# Patient Record
Sex: MALE | Race: WHITE | URBAN - METROPOLITAN AREA
[De-identification: names, ages, dates, MRNs, and addresses within clinical notes are randomized per-mention and may not be internally consistent; named-entity substitution may affect disease eponyms.]

---

## 2017-01-03 ENCOUNTER — TRANSFERRED RECORDS (OUTPATIENT)
Dept: HEALTH INFORMATION MANAGEMENT | Facility: CLINIC | Age: 62
End: 2017-01-03

## 2017-01-30 ENCOUNTER — TRANSFERRED RECORDS (OUTPATIENT)
Dept: HEALTH INFORMATION MANAGEMENT | Facility: CLINIC | Age: 62
End: 2017-01-30

## 2017-02-05 ENCOUNTER — TRANSFERRED RECORDS (OUTPATIENT)
Dept: HEALTH INFORMATION MANAGEMENT | Facility: CLINIC | Age: 62
End: 2017-02-05

## 2017-05-01 ENCOUNTER — TRANSFERRED RECORDS (OUTPATIENT)
Dept: HEALTH INFORMATION MANAGEMENT | Facility: CLINIC | Age: 62
End: 2017-05-01

## 2017-05-26 ENCOUNTER — APPOINTMENT (OUTPATIENT)
Dept: TRANSPLANT | Facility: CLINIC | Age: 62
End: 2017-05-26

## 2017-05-26 ENCOUNTER — TELEPHONE (OUTPATIENT)
Dept: TRANSPLANT | Facility: CLINIC | Age: 62
End: 2017-05-26

## 2017-05-26 DIAGNOSIS — N18.6 END STAGE RENAL DISEASE (H): ICD-10-CM

## 2017-05-26 DIAGNOSIS — N05.1 FSGS (FOCAL SEGMENTAL GLOMERULOSCLEROSIS): ICD-10-CM

## 2017-05-26 DIAGNOSIS — I07.1 TRICUSPID REGURGITATION: ICD-10-CM

## 2017-05-26 DIAGNOSIS — I10 ESSENTIAL HYPERTENSION: ICD-10-CM

## 2017-05-26 DIAGNOSIS — Z76.82 ORGAN TRANSPLANT CANDIDATE: ICD-10-CM

## 2017-05-26 DIAGNOSIS — I25.10 CARDIOVASCULAR DISEASE: ICD-10-CM

## 2017-05-26 NOTE — LETTER
Bro Barnes  [address] [facility] COVE  [place] R2J 4A4          Dear Bro,    Thank you for your interest in the Transplant Center at [facility], [facility]. We look forward to being a part your care team and assisting you through the transplant process.    As we discussed, your transplant coordinator is Charmaine Fritz .  You may call your coordinator at any time with questions or concerns.    Please complete the following.    1. Sign up for:    [employer], your electronic medical record    [employer], the Transplant Center's website (see enclosed booklet)    You can use these tools to learn more about your transplant, communicate with your care team, and track your medical details    2. Fill out and return the enclosed forms    Authorization for Electronic Communication    Authorization to Discuss Protected Health Information    [employer] Release of Information       Best Wishes,      Solid Organ Transplant Intake  [facility], [facility]    cc: Referring Physician        Dialysis Unit        PCP

## 2017-05-26 NOTE — TELEPHONE ENCOUNTER
Intake Progress Note  Organ:  kidney    Referral Came Via (Fax from/phone call from):  Letter from [facility]    Referring Physician:  Dr. 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 is best but try anytime    Records:  [facility] requested from: [facility], [facility], [address] [place] [address], [facility]     Insurance information:  [place] [employer]  Policy randall:  self  Subscriber/policy/ID number: [facility] [id number]  [facility] [id number]    History of diabetes:  no            History of a kidney biopsy:  yes         If Yes,when and where:  3[facility]    Past Medical and Surgical History (updated in Epic medical / surgical):             History of  cancer personally:  no,                                History of cardiac events: heart tx in 2016              History abdominal surgery : no                        History of previous transplant: yes heart [facility] 2013                Listed or in eval at another transplant center?  [facility]             History of hospitalization in last 12 months:  no                  History of blood transfusion:  Yes      History of smoking: no            On dialysis:yes Where: [address] [place] [address] home hemo                 Type of Dialysis: home hemo  Start date: 10/5/2016       Dialysis Days:  Nightly at home       If NYOD, estimated GFR: 6   Height:  1.72 M  Weight:  80kg  BMI:  27    Health Maintenance:  Colon:  never  PSA: yearly at PCP  Dental: last month, Dr. Mikey Meyers  Vaccines up to date  Special Needs (ie--wheelchair, assistance, guardian, interpretor):  no      Informed patient on the need to arrange age appropriate cancer screening, vaccines up to date and dental clearance    Reviewed evaluation process  and reminded patient to complete questionnaire, complete medical records release, and review packet prior to evaluation visit     Informed patient that coordinator will review their chart and insurance coverage and if no concerns they will receive a call from a  to schedule evaluation

## 2017-05-26 NOTE — TELEPHONE ENCOUNTER
I received a referral from Dr. Mills from the transplant office at [facility], in [facility]. I attempted to contact him and reached his wife. I gave her my name, number and hours and she is going to relay the message to Bro.

## 2017-05-30 ENCOUNTER — MEDICAL CORRESPONDENCE (OUTPATIENT)
Dept: TRANSPLANT | Facility: CLINIC | Age: 62
End: 2017-05-30

## 2017-05-31 ENCOUNTER — MEDICAL CORRESPONDENCE (OUTPATIENT)
Dept: TRANSPLANT | Facility: CLINIC | Age: 62
End: 2017-05-31

## 2017-06-05 ENCOUNTER — MEDICAL CORRESPONDENCE (OUTPATIENT)
Dept: TRANSPLANT | Facility: CLINIC | Age: 62
End: 2017-06-05

## 2017-06-13 ENCOUNTER — MEDICAL CORRESPONDENCE (OUTPATIENT)
Dept: TRANSPLANT | Facility: CLINIC | Age: 62
End: 2017-06-13

## 2017-06-29 NOTE — TELEPHONE ENCOUNTER
Contacted patient and introduced myself as their Transplant Coordinator, also introduced the role of the Transplant Coordinator in the transplant process.  Explained the purpose of this call including reviewing next steps and answering questions.    Confirmed Referring Provider, Dialysis Center, and Primary Care Physician. Notified patient of the importance of continued communication with referring providers and primary care physicians.    Reviewed components of transplant evaluation process including necessary appointments, tests, and procedures.    Answered questions for patient regarding evaluation, provided my name and contact information and requested they call with any additional questions.    Determined that patient would like additional information regarding transplant by:     Drop Down choices: Mail, Email, MyChart, Phone Call   Encourage MyChart   Notified patients that they will hear from a Transplant  to schedule evaluation.       Medical records reviewed in [email or web address] and patient interviewed. 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 He developed ischemic cardiomyopathy in 2003, had an AICD placed after cardiac arrest and LVAD placed in 2012 and ultimately heart transplant in 2013. 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 He is listed for kidney transplant at [facility] but PRA is 100% and he is hoping to increase his chances of transplant by listing here as well. He does have a potential living donor that has been approved in [place] but incompatible blood type. They are interested in PEP. He is aware that he will have to go through an evaluation here as well as his donor. History also include migraines, UTI's from bacterial prostatitis that is no longer an issue,tricuspid regurgitation in transplanted heart, HTN, hypothyroid, GERD, SPEP IgG restriction ( has  been discharged from his hematologist with the recommendation of annual SPEP/UPEP) splenomegaly, mild thrombocytopenia and anemia. Surgeries include the heart transplant, exploratory lap for SBO with no resection of bowel -only lysis of adhesions and right lymphocele repair. His dental is up to date 4/2017. Interestingly, he has never had a colonoscopy and was informed that is the gold standard here and he will have one done. He is due to see his cardiologist on July 10,2017 and will utilize this visit for cardiac clearance for kidney transplant. Has received blood transfusions. Never a smoker. No etoh. BMI 27. All records acceptable to proceed with pre kidney evaluation.    We discussed the 2 day evaluation process. First day he will arrive fasting and have his labs drawn. Once labs are drawn he will be allowed to eat breakfast and was encouraged to bring breakfast or money to purchase. We talked about the online presentation in a group setting of [facility] and he was encouraged to bring someone with him. Reviewed the list of providers he will see and their roles. Discussed the overall evaluation/approval/wait list/living donor and PEP process. He will check with his donor as to availability to travel here as she works too. He will fax me documentation confirming his actual dialysis start date. He is aware his next contact will be from Shameka in scheduling. Provided my and Shameka's contact information. He stated the transplant evaluation process here is very similar to the [place] system.

## 2017-06-30 ENCOUNTER — TELEPHONE (OUTPATIENT)
Dept: TRANSPLANT | Facility: CLINIC | Age: 62
End: 2017-06-30

## 2017-07-10 ENCOUNTER — TELEPHONE (OUTPATIENT)
Dept: TRANSPLANT | Facility: CLINIC | Age: 62
End: 2017-07-10

## 2017-07-10 ENCOUNTER — TRANSFERRED RECORDS (OUTPATIENT)
Dept: HEALTH INFORMATION MANAGEMENT | Facility: CLINIC | Age: 62
End: 2017-07-10

## 2017-07-10 NOTE — LETTER
July 11, 2017    Bro Barnes  [address]  [place] R2J 4A4      Dear Mr. Barnes,    Attached is your Pre Kidney Evaluation schedule on July 31, 2017 and your dialysis schedule on August 1, 2017. Please feel free to contact me at [phone], if you have any questions.       Sincerely,   Shameka GARCIA    CC:Loretta FERNANDEZ                                                    [facility] [address]  [address]      EVALUATION SCHEDULE: KIDNEY TRANSPLANT SLOT 3 EVAL      Patient:   BRO BARNES  MR#:    [id number]  Coordinator:   Loretta FERNANDEZ     [phone]   :   Shameka GARCIA     [phone]  Location:   Transplant Center Clinic 3A  Date:    July 31, 2017 & August 1, 2017      This is your evaluation schedule, please follow dates and times.  You  will receive reminder phone calls for other tests, but please follow this schedule only!  If you have any questions about dates and times,  please call us on number listed above.  Thank you, Transplant Clinic.         NO FOOD or DRINK AFTER MIDNIGHT  (You may only have small amounts of water)      Day/Date:   Monday, July 31, 2017  Time Location Activity   6:30a.m. [facility]  Imaging and Lab Testing  1st floor Blood tests (fasting, PLEASE)    7:00a.m. [facility]  Imaging and Lab Testing  1st floor Aorta Imaging =NO FOOD or DRINK AFTER MIDNIGHT!!   7:30a.m. [facility]  Transplant Services  3rd floor; Clinic 3A Review evaluation process, vitals, medication review   7:50a.m-  9:00a.m. [facility]  Transplant Services  3rd floor; Clinic 3A Pre-transplant class   9:00a.m. [facility]  Transplant Services  3rd floor; Clinic 3A; Consult Room Appointment with Natalie Mcnally,  Registered Dietitian   9:30a.m. [facility]  Transplant  Services  3rd floor; Clinic 3A Appointment with Dr. Cordero.   Transplant Surgeon   10:00a.m. [facility]  Transplant Services  3rd floor; Clinic 3A Meet with Loretta FERNANDEZ,  Transplant Coordinator   10:30a.m. [facility]  Transplant Services  3rd floor; Clinic 3B Appointment with Dr. Suarez,   Transplant Nephrologist   11:15a.m. [facility]  Transplant Services  3rd floor; Clinic 3A; Consult Room Appointment with either Oneyda Mullins,  Transplant      12:15p.m.-  12:30p.m. [facility]  Transplant Services  3rd floor; Clinic 3A; Consult Room Research    12:30p.m.-  1:30p.m. LUNCH    1:30p.m. [facility]  Imaging and Lab Testing  1st floor 2nd ABO blood draw - confirmation of 1st draw   1:45p.m. [facility]  Imaging and Lab Testing  1st floor Chest X-ray    2:20p.m. [facility]  Imaging and Lab Testing  1st floor EKG   4:00p.m. [facility]  Cardiology/Heart Care Clinic  3rd floor; Clinic 3L Echocardiogram       Day/Date:   Tuesday, August 1, 2017  Time Location Activity   2:00p.m. Dialysis Unit  [place] [address] [address]  [address] Dialysis

## 2017-07-14 ENCOUNTER — MEDICAL CORRESPONDENCE (OUTPATIENT)
Dept: TRANSPLANT | Facility: CLINIC | Age: 62
End: 2017-07-14

## 2017-07-31 ENCOUNTER — ALLIED HEALTH/NURSE VISIT (OUTPATIENT)
Dept: TRANSPLANT | Facility: CLINIC | Age: 62
End: 2017-07-31
Attending: INTERNAL MEDICINE
Payer: COMMERCIAL

## 2017-07-31 ENCOUNTER — OFFICE VISIT (OUTPATIENT)
Dept: TRANSPLANT | Facility: CLINIC | Age: 62
End: 2017-07-31
Attending: SURGERY
Payer: COMMERCIAL

## 2017-07-31 ENCOUNTER — RADIANT APPOINTMENT (OUTPATIENT)
Dept: CARDIOLOGY | Facility: CLINIC | Age: 62
End: 2017-07-31
Attending: PHYSICIAN ASSISTANT

## 2017-07-31 ENCOUNTER — RESULTS ONLY (OUTPATIENT)
Dept: OTHER | Facility: CLINIC | Age: 62
End: 2017-07-31

## 2017-07-31 VITALS
HEART RATE: 96 BPM | SYSTOLIC BLOOD PRESSURE: 115 MMHG | BODY MASS INDEX: 27.16 KG/M2 | OXYGEN SATURATION: 98 % | DIASTOLIC BLOOD PRESSURE: 73 MMHG | HEIGHT: 68 IN | TEMPERATURE: 98.4 F | RESPIRATION RATE: 18 BRPM | WEIGHT: 179.2 LBS

## 2017-07-31 DIAGNOSIS — Z76.82 AWAITING ORGAN TRANSPLANT: Primary | ICD-10-CM

## 2017-07-31 DIAGNOSIS — I10 ESSENTIAL HYPERTENSION: ICD-10-CM

## 2017-07-31 DIAGNOSIS — N18.6 END STAGE RENAL DISEASE (H): ICD-10-CM

## 2017-07-31 DIAGNOSIS — N05.1 FSGS (FOCAL SEGMENTAL GLOMERULOSCLEROSIS): ICD-10-CM

## 2017-07-31 DIAGNOSIS — Z76.82 ORGAN TRANSPLANT CANDIDATE: ICD-10-CM

## 2017-07-31 DIAGNOSIS — Z76.82 ORGAN TRANSPLANT CANDIDATE: Primary | ICD-10-CM

## 2017-07-31 DIAGNOSIS — I25.10 CARDIOVASCULAR DISEASE: ICD-10-CM

## 2017-07-31 DIAGNOSIS — N28.1 RENAL CYST, LEFT: ICD-10-CM

## 2017-07-31 DIAGNOSIS — R82.81 PYURIA: Primary | ICD-10-CM

## 2017-07-31 DIAGNOSIS — R80.9 PROTEINURIA, UNSPECIFIED TYPE: ICD-10-CM

## 2017-07-31 LAB
ABO + RH BLD: NORMAL
ALBUMIN SERPL-MCNC: 3.9 G/DL (ref 3.4–5)
ALBUMIN UR-MCNC: >499 MG/DL
ALP SERPL-CCNC: 105 U/L (ref 40–150)
ALT SERPL W P-5'-P-CCNC: 24 U/L (ref 0–70)
AMORPH CRY #/AREA URNS HPF: ABNORMAL /HPF
ANION GAP SERPL CALCULATED.3IONS-SCNC: 9 MMOL/L (ref 3–14)
APPEARANCE UR: ABNORMAL
APTT PPP: 30 SEC (ref 22–37)
AST SERPL W P-5'-P-CCNC: 16 U/L (ref 0–45)
BACTERIA #/AREA URNS HPF: ABNORMAL /HPF
BASOPHILS # BLD AUTO: 0 10E9/L (ref 0–0.2)
BASOPHILS NFR BLD AUTO: 0.5 %
BILIRUB SERPL-MCNC: 0.6 MG/DL (ref 0.2–1.3)
BILIRUB UR QL STRIP: NEGATIVE
BLD GP AB SCN SERPL QL: NORMAL
BLD GP AB SCN TITR SERPL: NORMAL {TITER}
BLOOD BANK CMNT PATIENT-IMP: NORMAL
BUN SERPL-MCNC: 48 MG/DL (ref 7–30)
CALCIUM SERPL-MCNC: 8.9 MG/DL (ref 8.5–10.1)
CARDIOLIPIN ANTIBODY IGG: NORMAL GPL-U/ML (ref 0–19.9)
CARDIOLIPIN ANTIBODY IGM: 0.4 MPL-U/ML (ref 0–19.9)
CHLORIDE SERPL-SCNC: 94 MMOL/L (ref 94–109)
CHOLEST SERPL-MCNC: 102 MG/DL
CMV IGG SERPL QL IA: ABNORMAL AI (ref 0–0.8)
CO2 SERPL-SCNC: 31 MMOL/L (ref 20–32)
COLOR UR AUTO: ABNORMAL
CREAT SERPL-MCNC: 8.02 MG/DL (ref 0.66–1.25)
DIFFERENTIAL METHOD BLD: ABNORMAL
EBV VCA IGG SER QL IA: NORMAL AI (ref 0–0.8)
EOSINOPHIL # BLD AUTO: 0 10E9/L (ref 0–0.7)
EOSINOPHIL NFR BLD AUTO: 1 %
ERYTHROCYTE [DISTWIDTH] IN BLOOD BY AUTOMATED COUNT: 13.2 % (ref 10–15)
GFR SERPL CREATININE-BSD FRML MDRD: 7 ML/MIN/1.7M2
GLUCOSE SERPL-MCNC: 106 MG/DL (ref 70–99)
GLUCOSE UR STRIP-MCNC: NEGATIVE MG/DL
HBV CORE AB SERPL QL IA: NONREACTIVE
HBV SURFACE AB SERPL IA-ACNC: 15.97 M[IU]/ML
HBV SURFACE AG SERPL QL IA: NONREACTIVE
HCT VFR BLD AUTO: 28.2 % (ref 40–53)
HCV AB SERPL QL IA: NORMAL
HDLC SERPL-MCNC: 43 MG/DL
HGB BLD-MCNC: 9 G/DL (ref 13.3–17.7)
HGB UR QL STRIP: ABNORMAL
HIV 1+2 AB+HIV1 P24 AG SERPL QL IA: NORMAL
HLA TYPING COMPLETE SOT RECIPIENT: NORMAL
HYALINE CASTS #/AREA URNS LPF: 2 /LPF (ref 0–2)
IMM GRANULOCYTES # BLD: 0 10E9/L (ref 0–0.4)
IMM GRANULOCYTES NFR BLD: 0.2 %
INR PPP: 1.1 (ref 0.86–1.14)
KETONES UR STRIP-MCNC: 5 MG/DL
LDLC SERPL CALC-MCNC: 43 MG/DL
LEUKOCYTE ESTERASE UR QL STRIP: ABNORMAL
LYMPHOCYTES # BLD AUTO: 1.2 10E9/L (ref 0.8–5.3)
LYMPHOCYTES NFR BLD AUTO: 27.6 %
MCH RBC QN AUTO: 31.4 PG (ref 26.5–33)
MCHC RBC AUTO-ENTMCNC: 31.9 G/DL (ref 31.5–36.5)
MCV RBC AUTO: 98 FL (ref 78–100)
MONOCYTES # BLD AUTO: 0.5 10E9/L (ref 0–1.3)
MONOCYTES NFR BLD AUTO: 11.3 %
MUCOUS THREADS #/AREA URNS LPF: PRESENT /LPF
NEUTROPHILS # BLD AUTO: 2.5 10E9/L (ref 1.6–8.3)
NEUTROPHILS NFR BLD AUTO: 59.4 %
NITRATE UR QL: NEGATIVE
NONHDLC SERPL-MCNC: 59 MG/DL
NRBC # BLD AUTO: 0 10*3/UL
NRBC BLD AUTO-RTO: 0 /100
PH UR STRIP: 5 PH (ref 5–7)
PHOSPHATE SERPL-MCNC: 3.6 MG/DL (ref 2.5–4.5)
PLATELET # BLD AUTO: 119 10E9/L (ref 150–450)
POTASSIUM SERPL-SCNC: 4.2 MMOL/L (ref 3.4–5.3)
PRA SINGLE ANTIGEN IGG ANTIBODY: NORMAL
PROT SERPL-MCNC: 6.8 G/DL (ref 6.8–8.8)
PSA SERPL-ACNC: 0.68 UG/L (ref 0–4)
RBC # BLD AUTO: 2.87 10E12/L (ref 4.4–5.9)
RBC #/AREA URNS AUTO: 18 /HPF (ref 0–2)
SODIUM SERPL-SCNC: 134 MMOL/L (ref 133–144)
SP GR UR STRIP: 1.02 (ref 1–1.03)
SPECIMEN EXP DATE BLD: NORMAL
SPECIMEN EXP DATE BLD: NORMAL
SQUAMOUS #/AREA URNS AUTO: 2 /HPF (ref 0–1)
T PALLIDUM IGG+IGM SER QL: NEGATIVE
THROMBIN TIME: 17.5 SEC (ref 13–19)
TRIGL SERPL-MCNC: 76 MG/DL
URN SPEC COLLECT METH UR: ABNORMAL
UROBILINOGEN UR STRIP-MCNC: 0 MG/DL (ref 0–2)
VZV IGG SER QL IA: 6.1 AI (ref 0–0.8)
WBC # BLD AUTO: 4.2 10E9/L (ref 4–11)
WBC #/AREA URNS AUTO: 151 /HPF (ref 0–2)
WBC CLUMPS #/AREA URNS HPF: PRESENT /HPF

## 2017-07-31 PROCEDURE — 86850 RBC ANTIBODY SCREEN: CPT | Performed by: PHYSICIAN ASSISTANT

## 2017-07-31 PROCEDURE — 82784 ASSAY IGA/IGD/IGG/IGM EACH: CPT | Performed by: PHYSICIAN ASSISTANT

## 2017-07-31 PROCEDURE — 80053 COMPREHEN METABOLIC PANEL: CPT | Performed by: PHYSICIAN ASSISTANT

## 2017-07-31 PROCEDURE — 84166 PROTEIN E-PHORESIS/URINE/CSF: CPT | Performed by: PHYSICIAN ASSISTANT

## 2017-07-31 PROCEDURE — 86833 HLA CLASS II HIGH DEFIN QUAL: CPT | Performed by: INTERNAL MEDICINE

## 2017-07-31 PROCEDURE — 86780 TREPONEMA PALLIDUM: CPT | Performed by: PHYSICIAN ASSISTANT

## 2017-07-31 PROCEDURE — 86900 BLOOD TYPING SEROLOGIC ABO: CPT | Mod: 91 | Performed by: PHYSICIAN ASSISTANT

## 2017-07-31 PROCEDURE — 86704 HEP B CORE ANTIBODY TOTAL: CPT | Performed by: PHYSICIAN ASSISTANT

## 2017-07-31 PROCEDURE — 86665 EPSTEIN-BARR CAPSID VCA: CPT | Performed by: PHYSICIAN ASSISTANT

## 2017-07-31 PROCEDURE — 85730 THROMBOPLASTIN TIME PARTIAL: CPT | Mod: 91 | Performed by: PHYSICIAN ASSISTANT

## 2017-07-31 PROCEDURE — 87340 HEPATITIS B SURFACE AG IA: CPT | Performed by: PHYSICIAN ASSISTANT

## 2017-07-31 PROCEDURE — 97802 MEDICAL NUTRITION INDIV IN: CPT | Mod: ZF | Performed by: DIETITIAN, REGISTERED

## 2017-07-31 PROCEDURE — 86147 CARDIOLIPIN ANTIBODY EA IG: CPT | Performed by: PHYSICIAN ASSISTANT

## 2017-07-31 PROCEDURE — 81240 F2 GENE: CPT | Performed by: PHYSICIAN ASSISTANT

## 2017-07-31 PROCEDURE — 84100 ASSAY OF PHOSPHORUS: CPT | Performed by: PHYSICIAN ASSISTANT

## 2017-07-31 PROCEDURE — 86706 HEP B SURFACE ANTIBODY: CPT | Performed by: PHYSICIAN ASSISTANT

## 2017-07-31 PROCEDURE — 86901 BLOOD TYPING SEROLOGIC RH(D): CPT | Performed by: PHYSICIAN ASSISTANT

## 2017-07-31 PROCEDURE — 86905 BLOOD TYPING RBC ANTIGENS: CPT | Performed by: PHYSICIAN ASSISTANT

## 2017-07-31 PROCEDURE — 40000866 ZZHCL STATISTIC HIV 1/2 ANTIGEN/ANTIBODY PRETRANSPLANT ONLY: Performed by: PHYSICIAN ASSISTANT

## 2017-07-31 PROCEDURE — 81001 URINALYSIS AUTO W/SCOPE: CPT | Performed by: PHYSICIAN ASSISTANT

## 2017-07-31 PROCEDURE — 86644 CMV ANTIBODY: CPT | Performed by: PHYSICIAN ASSISTANT

## 2017-07-31 PROCEDURE — 86886 COOMBS TEST INDIRECT TITER: CPT | Performed by: PHYSICIAN ASSISTANT

## 2017-07-31 PROCEDURE — G0103 PSA SCREENING: HCPCS | Performed by: PHYSICIAN ASSISTANT

## 2017-07-31 PROCEDURE — 86334 IMMUNOFIX E-PHORESIS SERUM: CPT | Performed by: PHYSICIAN ASSISTANT

## 2017-07-31 PROCEDURE — 80061 LIPID PANEL: CPT | Performed by: PHYSICIAN ASSISTANT

## 2017-07-31 PROCEDURE — 83883 ASSAY NEPHELOMETRY NOT SPEC: CPT | Performed by: PHYSICIAN ASSISTANT

## 2017-07-31 PROCEDURE — 85025 COMPLETE CBC W/AUTO DIFF WBC: CPT | Performed by: PHYSICIAN ASSISTANT

## 2017-07-31 PROCEDURE — 86335 IMMUNFIX E-PHORSIS/URINE/CSF: CPT | Performed by: PHYSICIAN ASSISTANT

## 2017-07-31 PROCEDURE — 84165 PROTEIN E-PHORESIS SERUM: CPT | Performed by: PHYSICIAN ASSISTANT

## 2017-07-31 PROCEDURE — 81376 HLA II TYPING 1 LOCUS LR: CPT | Performed by: INTERNAL MEDICINE

## 2017-07-31 PROCEDURE — 85610 PROTHROMBIN TIME: CPT | Performed by: PHYSICIAN ASSISTANT

## 2017-07-31 PROCEDURE — 87799 DETECT AGENT NOS DNA QUANT: CPT | Performed by: PHYSICIAN ASSISTANT

## 2017-07-31 PROCEDURE — 81241 F5 GENE: CPT | Performed by: PHYSICIAN ASSISTANT

## 2017-07-31 PROCEDURE — 86787 VARICELLA-ZOSTER ANTIBODY: CPT | Performed by: PHYSICIAN ASSISTANT

## 2017-07-31 PROCEDURE — 00000402 ZZHCL STATISTIC TOTAL PROTEIN: Performed by: PHYSICIAN ASSISTANT

## 2017-07-31 PROCEDURE — 86480 TB TEST CELL IMMUN MEASURE: CPT | Performed by: PHYSICIAN ASSISTANT

## 2017-07-31 PROCEDURE — 85730 THROMBOPLASTIN TIME PARTIAL: CPT | Performed by: PHYSICIAN ASSISTANT

## 2017-07-31 PROCEDURE — 81370 HLA I & II TYPING LR: CPT | Performed by: INTERNAL MEDICINE

## 2017-07-31 PROCEDURE — 85613 RUSSELL VIPER VENOM DILUTED: CPT | Performed by: PHYSICIAN ASSISTANT

## 2017-07-31 PROCEDURE — 85670 THROMBIN TIME PLASMA: CPT | Performed by: PHYSICIAN ASSISTANT

## 2017-07-31 PROCEDURE — 86832 HLA CLASS I HIGH DEFIN QUAL: CPT | Performed by: INTERNAL MEDICINE

## 2017-07-31 PROCEDURE — 87086 URINE CULTURE/COLONY COUNT: CPT | Performed by: PHYSICIAN ASSISTANT

## 2017-07-31 PROCEDURE — 36415 COLL VENOUS BLD VENIPUNCTURE: CPT | Performed by: PHYSICIAN ASSISTANT

## 2017-07-31 PROCEDURE — 86803 HEPATITIS C AB TEST: CPT | Performed by: PHYSICIAN ASSISTANT

## 2017-07-31 RX ORDER — CANDESARTAN 4 MG/1
2 TABLET ORAL DAILY
COMMUNITY

## 2017-07-31 RX ORDER — TACROLIMUS 1 MG/1
1 CAPSULE, GELATIN COATED ORAL 2 TIMES DAILY
COMMUNITY

## 2017-07-31 RX ORDER — MYCOPHENOLATE MOFETIL 500 MG/1
750 TABLET, FILM COATED ORAL
COMMUNITY

## 2017-07-31 NOTE — PROGRESS NOTES
Psychosocial Assessment  Patient Name/ Age: Bro Barnes 62 year old   Medical Record #: [id number]  Duration of Interview: 30 min  Process:   Face-to-Face Interview                (counseling < 50%)   Present at Appointment: Bro and wife Marlys        : STEPHANIE Saleem  Date:  July 31, 2017        Type of transplant: Kidney    Donor type: Bro reported his sister-in-law is approved in [place] through paired exchange and is hopeful he can be      [place] and sister-in-law   Prior Transplants:    Yes - Heart 2013 Status of Transplant: Bro reported functioning well       Current Living Situation    Location:   [address] [place] [address] R2J 4A4  [place]  With Whom: lives with their spouse       Family/ Social Support:    Bro reported he has two adult sons who live in [place]. Bro reported he has two brothers who live in [place] and one in [place]. 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 Encouraged Bro to work with [employer] regarding travel/lodging.        Medical Status    Current mode of treatment for ESRD dialysis   Complications none       Behavioral    Tobacco Use No Chemical Dependency No   Bro denied any tobacco use.  Bro reported he drinks about one glass of wine a month. Bro denied any substance use or history of chemical  dependency treatment.      Psychiatric Impairment No  Bro denied any current or history of anxiety, depression, or other mental health concerns.    Reading ability Good  Education Level: Medical Degree Recent Legal History No      Coping Style/Strategies: Exercise, reading, listening to music, being with family       Ability to Adhere to Complex Medical Regime: Yes     Adherence History: Bro reported he follows his physician's recommendations, takes his medications as prescribed, and attends his appointments as scheduled.         Education  _X_ Medicare  _X_ Rehabilitation  _X_ Donor issues  _X_ Community resources  _X_ Post discharge housing  _X_ Financial resources  _X_ Medical insurance options  _X_ Psych adjustment  _X_ Family adjustment  _X_ Health Care Directive Okay with wife   Psychosocial Risks of Transplant Reviewed and Discussed:  _X_ Increased stress related to emotional,            family, social, employment or financial           situation  _X_ Affect on work and/or disability benefits  _X_ Affect on future health and life           insurance  _X_ Transplant outcome expectations may           not be met  _X_ Mental Health Risks: anxiety,           depression, PTSD, guilt, grief and           chronic fatigue     Notable Items:   None noted.       Final Evaluation/Assessment   Patient seemed to process information well. Appeared well informed, motivated and able to follow post transplant requirements. Behavior was appropriate during interview. Has adequate income and insurance coverage. Adequate social support. No major contraindications noted for transplant.  At this time patient appears to understand the risks and benefits of transplant.      Recommendation  Acceptable    Selection Criteria Met:  Plan for support Yes   Chemical Dependence Yes   Smoking Yes   Mental Health Yes   Adequate Finances Yes    Signature: STEPHANIE Saleem    Title: Clinical

## 2017-07-31 NOTE — MR AVS SNAPSHOT
"              After Visit Summary   7/31/2017    Bro Barnes    MRN: [id number]           Patient Information     Date Of Birth          1955        Visit Information        Provider Department      7/31/2017 10:30 AM  [facility] PATIENT 3 [facility] Solid Organ Transplant        Today's Diagnoses     Pyuria    -  1    Proteinuria, unspecified type        Renal cyst, left           Follow-ups after your visit        Who to contact     If you have questions or need follow up information about today's clinic visit or your schedule please contact [facility] SOLID ORGAN TRANSPLANT directly at [phone].  Normal or non-critical lab and imaging results will be communicated to you by [employer], letter or phone within 4 business days after the clinic has received the results. If you do not hear from us within 7 days, please contact the clinic through [employer] or phone. If you have a critical or abnormal lab result, we will notify you by phone as soon as possible.  Submit refill requests through [employer] or call your pharmacy and they will forward the refill request to us. Please allow 3 business days for your refill to be completed.          Additional Information About Your Visit        MyChart Information     [employer] gives you secure access to your electronic health record. If you see a primary care provider, you can also send messages to your care team and make appointments. If you have questions, please call your primary care clinic.  If you do not have a primary care provider, please call [phone] and they will assist you.        Care EveryWhere ID     This is your Care EveryWhere ID. This could be used by other organizations to access your [facility] medical records  [id number]        Your Vitals Were     Pulse Temperature Respirations Height Pulse Oximetry BMI (Body Mass Index)    96 98.4  F (36.9  C) (Oral) 18 1.72 m (5' 7.72\") 98% 27.48 kg/m2       Blood Pressure from Last 3 Encounters:   07/31/17 115/73    " Weight from Last 3 Encounters:   07/31/17 81.3 kg (179 lb 3.2 oz)              We Performed the Following     Protein electrophoresis random urine     Protein immunofixation urine     Urine Culture Aerobic Bacterial        Primary Care Provider Office Phone # Fax #    Eduardo Coleman [phone]       [facility] [address]  [facility] [address]  [place]        Equal Access to Services     [facility] : Hadii aad ku hadasho Soomaali, waaxda luqadaha, qaybta kaalmada adeegyada, waxay idiin hayaan adesaritha khjorgesh la'aan . So [facility] [phone].    ATENCIÓN: Si habla español, tiene a ortiz disposición servicios gratuitos de asistencia lingüística. Llame al [phone].    We comply with applicable federal civil rights laws and [place] laws. We do not discriminate on the basis of race, color, national origin, age, disability sex, sexual orientation or gender identity.            Thank you!     Thank you for choosing [facility] SOLID ORGAN TRANSPLANT  for your care. Our goal is always to provide you with excellent care. Hearing back from our patients is one way we can continue to improve our services. Please take a few minutes to complete the written survey that you may receive in the mail after your visit with us. Thank you!             Your Updated Medication List - Protect others around you: Learn how to safely use, store and throw away your medicines at www.disposemymeds.org.          This list is accurate as of: 7/31/17 11:59 PM.  Always use your most recent med list.                   Brand Name Dispense Instructions for use Diagnosis    ASPIRIN PO      Take 81 mg by mouth daily        ATORVASTATIN CALCIUM PO      Take 10 mg by mouth daily        candesartan 4 MG Tabs tablet    ATACAND     Take 2 mg by mouth daily        LASIX PO      Take 40 mg by mouth        LEVOTHYROXINE SODIUM PO      Take 25 mcg by mouth daily        mycophenolate tablet      Take 750 mg by mouth        OMEPRAZOLE PO       Take 10 mg by mouth daily        tacrolimus capsule      Take 1 mg by mouth 2 times daily        VITAMIN D (CHOLECALCIFEROL) PO      Take 2,000 Units by mouth daily

## 2017-07-31 NOTE — PROGRESS NOTES
Transplant Surgery Consult Note     Medical record number: [id number]  YOB: 1955,   Consult requested by Dr. Mills for evaluation of kidney transplant candidacy.    Assessment and Recommendations:Mr. Barnes appears to be a good candidate for kidney transplantation and has a excellent understanding of the risks and benefits of this approach to the management of renal failure. The following issues should be addressed prior to finalizing his transplant candidacy:     61 yo h/o cardiomyopathy and heart tx, doing well  CNI and Fsgs with dialysis dependence since 2016  100%pra  Listed in [place] bot DD and LD paired excahnge  Sister in law approved donor in [place]  Will need to register here.   List under all paired donor programs  That would be his best bet  In addition list for DD  No high KDPI at this time  May change in future depending on LD status and recip status  H/o lymphoceles in bilat groins post angio access        The majority of our visit was spent in counselling, discussing the medical and surgical risks of kidney transplantation. We discussed approximate wait time and how that is influenced by issues such as blood type and sensitization (PRA) and access to a living donor. I contrasted potential waiting time for living vs  donor kidneys from  normal (0-85%) or higher (%) kidney donor profile index (KDPI) donors and their associated outcomes. I would not recommend this individual to consider kidneys from high KDPI donors. The reason for this decision is best summarized as: patient strong preference. Potential surgical complications of kidney transplantation include bleeding, superficial or deep wound complications (infection, hernia, lymphocele), ureteral anastomotic failure (leak or stenosis), graft thrombosis, need for reoperation and other issues such as cardiac complications, pneumonia, deep venous thrombosis, pulmonary embolism, post transplant diabetes and death. The  potential for recurrent disease or need for retransplantation was also addressed. We discussed the possible need for ureteral stent (and subsequent removal), and the utility of protocol biopsy and laboratory studies to evaluate for rejection or recurrent disease. We discussed the risk of graft rejection, our center's average graft and patient survival rates, immunosuppression protocols, as well as the potential opportunity to participate in clinical trials.  We also discussed the average length of stay, recovery process, and posttransplant lab and monitoring protocol.  I emphasized the need for strict immunosuppression medication adherence and the potential for complications of immunosuppression such as skin cancer or lymphoma, as well as a very low but not zero risk of donor-derived disease transmission risks (infection, cancer). Mr. Barnes asked good questions and his candidacy will be reviewed at our Multidisciplinary Selection Committee. Thank you for the opportunity to participate in Mr. Barnes's care.      Total time: 45 minutes  Counselling time: 40 minutes    .    ---------------------------------------------------------------------------------------------------    HPI: Mr. 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 The patient is non-diabetic.       The patient is on dialysis.    Has potential kidney donors:  Yes .  Interested in participation in paired exchange if a donor is willing: Yes     The patient has the following pertinent history:       No    Yes  Dialysis:    [x]      [] via:       Blood Transfusion                  [x]      []  Number of units:   Most recently:  Pregnancy:    [x]      [] Number:       Previous Transplant:  [x]      [] Details:    Cancer    [x]      [] Comment:   Kidney stones   [x]      [] Comment:      Recurrent infections  [x]      []  Type:                  Bladder dysfunction  [x]      [] Cause:    Claudication   [x]      [] Distance:     Previous Amputation  [x]      [] Cause:     Chronic anticoagulation  [x]      [] Indication:   Restorationism  [x]      []    Past Medical History:   Diagnosis Date     Anemia      Bacterial UTI     from bacterial prostatitis- no longer a problem     ESRD (end stage renal disease) on dialysis (H)      FSGS (focal segmental glomerulosclerosis)      Gammopathy      Heart transplant recipient (H) 2013     HTN (hypertension)      Hypothyroid      Idiopathic cardiomyopathy (H)      Migraines     improved with BB     Splenomegaly      Thrombocytopenia (H)      Tricuspid regurgitation     in transplanted heart     Past Surgical History:   Procedure Laterality Date     AICD insertion and removal       LAPAROTOMY EXPLORATORY  04/2013    lysis of adhesions     LVAD insertion and removal        lymphocele repair  Right      TRANSPLANT HEART RECIPIENT  2013     No family history on file.  Social History     Social History     Marital status:      Spouse name: N/A     Number of children: N/A     Years of education: N/A     Occupational History     Not on file.     Social History Main Topics     Smoking status: Never Smoker     Smokeless tobacco: Never Used     Alcohol use No     Drug use: Not on file     Sexual activity: No     Other Topics Concern     Not on file     Social History Narrative       ROS:   CONSTITUTIONAL:  No fevers or chills  EYES: negative for icterus  ENT:  negative for hearing loss, tinnitus and sore throat  RESPIRATORY:  negative for cough, sputum, dyspnea  CARDIOVASCULAR:  negative for chest pain Fatigue  GASTROINTESTINAL:  negative for nausea, vomiting, diarrhea or constipation  GENITOURINARY:  negative for incontinence, dysuria, bladder emptying problems  HEME:  No easy bruising  INTEGUMENT:  negative for rash and pruritus  NEURO:  Negative for headache, seizure disorder  Allergies:   No Known Allergies  Medications:  Prescription Medications as of 7/31/2017             PROGRAF 1 MG PO CAPSULE  Take 1 mg by mouth 2 times daily    CELLCEPT 500 MG PO TABLET Take 750 mg by mouth    candesartan (ATACAND) 4 MG TABS tablet Take 2 mg by mouth daily    ATORVASTATIN CALCIUM PO Take 10 mg by mouth daily    ASPIRIN PO Take 81 mg by mouth daily    OMEPRAZOLE PO Take 10 mg by mouth daily    VITAMIN D, CHOLECALCIFEROL, PO Take 2,000 Units by mouth daily    LEVOTHYROXINE SODIUM PO Take 25 mcg by mouth daily    Furosemide (LASIX PO) Take 40 mg by mouth        Exam:   Temp:  [98.4  F (36.9  C)] 98.4  F (36.9  C)  Pulse:  [96] 96  Resp:  [18] 18  BP: (115)/(73) 115/73  SpO2:  [98 %] 98 %  Appearance: in no apparent distress.   Skin: normal  Head and Neck: Normal, no rashes or jaundice  Respiratory: easy respirations, no audible wheezing.  Cardiovascular: RRR  Abdomen: flat, No distention       Diagnostics:   Recent Results (from the past 672 hour(s))   Lipid Profile [LAB18]    Collection Time: 07/31/17  7:06 AM   Result Value Ref Range    Cholesterol 102 <200 mg/dL    Triglycerides 76 <150 mg/dL    HDL Cholesterol 43 >39 mg/dL    LDL Cholesterol Calculated 43 <100 mg/dL    Non HDL Cholesterol 59 <130 mg/dL   Comprehensive metabolic panel [LAB17]    Collection Time: 07/31/17  7:06 AM   Result Value Ref Range    Sodium 134 133 - 144 mmol/L    Potassium 4.2 3.4 - 5.3 mmol/L    Chloride 94 94 - 109 mmol/L    Carbon Dioxide 31 20 - 32 mmol/L    Anion Gap 9 3 - 14 mmol/L    Glucose 106 (H) 70 - 99 mg/dL    Urea Nitrogen 48 (H) 7 - 30 mg/dL    Creatinine 8.02 (H) 0.66 - 1.25 mg/dL    GFR Estimate 7 (L) >60 mL/min/1.7m2    GFR Estimate If Black 8 (L) >60 mL/min/1.7m2    Calcium 8.9 8.5 - 10.1 mg/dL    Bilirubin Total 0.6 0.2 - 1.3 mg/dL    Albumin 3.9 3.4 - 5.0 g/dL    Protein Total 6.8 6.8 - 8.8 g/dL    Alkaline Phosphatase 105 40 - 150 U/L    ALT 24 0 - 70 U/L    AST 16 0 - 45 U/L   Phosphorus    Collection Time: 07/31/17  7:06 AM   Result Value Ref Range    Phosphorus 3.6 2.5 - 4.5 mg/dL   Prostate spec antigen screen [RYX2675]     Collection Time: 07/31/17  7:06 AM   Result Value Ref Range    PSA 0.68 0 - 4 ug/L   INR [YDT7418]    Collection Time: 07/31/17  7:06 AM   Result Value Ref Range    INR 1.10 0.86 - 1.14   Partial thromboplastin time [LAB56]    Collection Time: 07/31/17  7:06 AM   Result Value Ref Range    PTT 30 22 - 37 sec   Thrombin time [TUC877]    Collection Time: 07/31/17  7:06 AM   Result Value Ref Range    Thrombin Time 17.5 13.0 - 19.0 sec   CBC with platelets differential [SES597]    Collection Time: 07/31/17  7:06 AM   Result Value Ref Range    WBC 4.2 4.0 - 11.0 10e9/L    RBC Count 2.87 (L) 4.4 - 5.9 10e12/L    Hemoglobin 9.0 (L) 13.3 - 17.7 g/dL    Hematocrit 28.2 (L) 40.0 - 53.0 %    MCV 98 78 - 100 fl    MCH 31.4 26.5 - 33.0 pg    MCHC 31.9 31.5 - 36.5 g/dL    RDW 13.2 10.0 - 15.0 %    Platelet Count 119 (L) 150 - 450 10e9/L    Diff Method Automated Method     % Neutrophils 59.4 %    % Lymphocytes 27.6 %    % Monocytes 11.3 %    % Eosinophils 1.0 %    % Basophils 0.5 %    % Immature Granulocytes 0.2 %    Nucleated RBCs 0 0 /100    Absolute Neutrophil 2.5 1.6 - 8.3 10e9/L    Absolute Lymphocytes 1.2 0.8 - 5.3 10e9/L    Absolute Monocytes 0.5 0.0 - 1.3 10e9/L    Absolute Eosinophils 0.0 0.0 - 0.7 10e9/L    Absolute Basophils 0.0 0.0 - 0.2 10e9/L    Abs Immature Granulocytes 0.0 0 - 0.4 10e9/L    Absolute Nucleated RBC 0.0    Hepatitis B core antibody [LSE6604]    Collection Time: 07/31/17  7:06 AM   Result Value Ref Range    Hepatitis B Core Liza Nonreactive NR   Hepatitis B Surface Antibody [ASY9298]    Collection Time: 07/31/17  7:06 AM   Result Value Ref Range    Hepatitis B Surface Antibody 15.97 (H) <8.00 m[IU]/mL   Hepatitis B surface antigen [XSW326]    Collection Time: 07/31/17  7:06 AM   Result Value Ref Range    Hep B Surface Agn Nonreactive NR   Hepatitis C antibody [CWV032]    Collection Time: 07/31/17  7:06 AM   Result Value Ref Range    Hepatitis C Antibody  NR     Nonreactive   Assay performance  characteristics have not been established for newborns,   infants, and children     HIV Antigen Antibody Combo Pretransplant    Collection Time: 07/31/17  7:06 AM   Result Value Ref Range    HIV Antigen Antibody Combo Pretransplant  NR     Nonreactive   HIV-1 p24 Ag & HIV-1/HIV-2 Ab Not Detected     ABO/Rh type and screen [WYL788]    Collection Time: 07/31/17  7:07 AM   Result Value Ref Range    ABO B     RH(D)  Pos     Antibody Screen Neg     Test Valid Only At       [facility]    Specimen Expires 08/03/2017    Routine UA with microscopic [OTS2058]    Collection Time: 07/31/17  7:18 AM   Result Value Ref Range    Color Urine Misty     Appearance Urine Cloudy     Glucose Urine Negative NEG mg/dL    Bilirubin Urine Negative NEG    Ketones Urine 5 (A) NEG mg/dL    Specific Gravity Urine 1.017 1.003 - 1.035    Blood Urine Small (A) NEG    pH Urine 5.0 5.0 - 7.0 pH    Protein Albumin Urine >499 (A) NEG mg/dL    Urobilinogen mg/dL 0.0 0.0 - 2.0 mg/dL    Nitrite Urine Negative NEG    Leukocyte Esterase Urine Large (A) NEG    Source Midstream Urine     WBC Urine 151 (H) 0 - 2 /HPF    RBC Urine 18 (H) 0 - 2 /HPF    WBC Clumps Present (A) NEG /HPF    Bacteria Urine Few (A) NEG /HPF    Squamous Epithelial /HPF Urine 2 (H) 0 - 1 /HPF    Mucous Urine Present (A) NEG /LPF    Hyaline Casts 2 0 - 2 /LPF    Amorphous Crystals Few (A) NEG /HPF     No results found for: CPRA

## 2017-07-31 NOTE — MR AVS SNAPSHOT
After Visit Summary   7/31/2017    Bro Barnes    MRN: [id number]           Patient Information     Date Of Birth          1955        Visit Information        Provider Department      7/31/2017 9:00 AM Viola Mcnally RD [facility] Solid Organ Transplant        Today's Diagnoses     Awaiting organ transplant    -  1    End stage renal disease (H)           Follow-ups after your visit        Who to contact     If you have questions or need follow up information about today's clinic visit or your schedule please contact [facility] SOLID ORGAN TRANSPLANT directly at [phone].  Normal or non-critical lab and imaging results will be communicated to you by [employer], letter or phone within 4 business days after the clinic has received the results. If you do not hear from us within 7 days, please contact the clinic through [employer]Jam[employer] or phone. If you have a critical or abnormal lab result, we will notify you by phone as soon as possible.  Submit refill requests through [employer]Cred or call your pharmacy and they will forward the refill request to us. Please allow 3 business days for your refill to be completed.          Additional Information About Your Visit        MyChart Information     [employer]Cred gives you secure access to your electronic health record. If you see a primary care provider, you can also send messages to your care team and make appointments. If you have questions, please call your primary care clinic.  If you do not have a primary care provider, please call [phone] and they will assist you.        Care EveryWhere ID     This is your Care EveryWhere ID. This could be used by other organizations to access your [facility] medical records  [id number]         Blood Pressure from Last 3 Encounters:   07/31/17 115/73    Weight from Last 3 Encounters:   07/31/17 81.3 kg (179 lb 3.2 oz)              Today, you had the following     No orders found for display       Primary Care Provider  Office Phone # Fax #    Eduardo Coleman [phone]       [facility] [address]  [address]  [place]        Equal Access to Services     RITIKA MADDOX : Hadmayank hernandez amelieo Sothiagoali, waaxda luqadaha, qaybta kaalmada adesiada, diogo deionin hayaanoah mistrybjorn graham suzanne galloway. So [facility] [phone].    ATENCIÓN: Si habla español, tiene a ortiz disposición servicios gratuitos de asistencia lingüística. Llame al [phone].    We comply with applicable federal civil rights laws and [place] laws. We do not discriminate on the basis of race, color, national origin, age, disability sex, sexual orientation or gender identity.            Thank you!     Thank you for choosing [facility] SOLID ORGAN TRANSPLANT  for your care. Our goal is always to provide you with excellent care. Hearing back from our patients is one way we can continue to improve our services. Please take a few minutes to complete the written survey that you may receive in the mail after your visit with us. Thank you!             Your Updated Medication List - Protect others around you: Learn how to safely use, store and throw away your medicines at www.disposemymeds.org.          This list is accurate as of: 7/31/17 11:59 PM.  Always use your most recent med list.                   Brand Name Dispense Instructions for use Diagnosis    ASPIRIN PO      Take 81 mg by mouth daily        ATORVASTATIN CALCIUM PO      Take 10 mg by mouth daily        candesartan 4 MG Tabs tablet    ATACAND     Take 2 mg by mouth daily        LASIX PO      Take 40 mg by mouth        LEVOTHYROXINE SODIUM PO      Take 25 mcg by mouth daily        mycophenolate tablet      Take 750 mg by mouth        OMEPRAZOLE PO      Take 10 mg by mouth daily        tacrolimus capsule      Take 1 mg by mouth 2 times daily        VITAMIN D (CHOLECALCIFEROL) PO      Take 2,000 Units by mouth daily

## 2017-07-31 NOTE — PROGRESS NOTES
Pre Abdominal Organ Transplant Coordinator Evaluation Note:    MD present: Dr. Cordero    Attendees: self and spouse    Type of transplant: kidney    Required Topic(s) Discussed: Evaluation notification document, SRTR data, Multiple wait list brochure, KDPI, Evaluation/approval process, Selection committee process, Wait list process and Living donor process    Teaching: Instruct patient on living donor process/provided contact info, Provided my business card and To call me with any questions    Assessment/Plan: I was not present for the provider visits. Met with Bro and his wife. 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 He is on home HD and actively listed for kidney transplant in [place]. His sister in law is listed as a donor through paired exchange in [place]. They are hoping to be listed in paired exchange in the [place] as well. He has a CPRA of 100% from [place]. He is very well informed about transplant. We did review the web site for [facility] and he was encouraged to watch all of the modules at home. He signed the Receipt of Information for Organ Transplant Recipient and the KDPI >85% form was left in the exam room for Bro to sign once he has the discussion with the surgeon. Documents will be scanned into EPIC. His dental is up to date and he has made arrangements for a colonoscopy when he returns to [place]. He is aware his evaluation results will be discusses at our Multidisciplinary Selection Committee next week and I will be in touch as to the outcome. They did not have any further questions.    Time spent with patient: 20 minutes

## 2017-07-31 NOTE — PROGRESS NOTES
NUTRITION KIDNEY TRANSPLANT EVALUATION  Medical Nutrition Therapy    Weight and BMI:  Current BMI: 27.48  BMI is within criteria of <35 for kidney transplant    Malnutrition Status:    Patient does not meet two of the above criteria necessary for diagnosing malnutrition at this time in the context of chronic illness    Additional Concerns:  None at this time       Visit Type: Initial Assessment    Bro Barnes referred by Dr. Adler for MNT related to kidney transplant evaluation    Patient accompanied by his SO    H/o previous txp: Pt has had Heart Transplant in 2013    Nutrition Assessment:  Anthropometrics  Height: 68 in   BMI:  27.48      Weight Status:Overweight BMI 25-29.9   Weight: 179 lbs            IBW (lb): 154 lbs  % IBW: 116 %    Wt Hx: No wt changes per pt    dosing BW: 179 lbs/81 kg       Recent Labs   Lab Test  07/31/17   0706   CHOL  102   HDL  43   LDL  43   TRIG  76     No results found for: A1C  Potassium   Date Value Ref Range Status   07/31/2017 4.2 3.4 - 5.3 mmol/L Final     Phosphorus: 3.6      Vitamins, Supplements, Herbals, Pertinent Meds: Prograf, Cellcept,Vitamin D, Lasix, Replavite (Renal vitamins)      Dialysis Modality: HD at home  Dialysis Start: October 2016      Nutrition History  Pt-reported special diets/eating habits: Mostly home made meals, Mediterranean diet with sodium restrictions  Dining out/food not made at home: Very rarely  Appetite: Good      Recall:  B: Bread with soft cheese and cereal with milk, berries and coffee  L:  Left over from previous night's dinner usually consists of rice, meat or fish with vegetables or or salads  D: Meat, rice, vegetables  Sn: Peanut, cookies, cranberries, chocolate  Beverages: water, coffee, soda (lemonade or sprite)  ETOH (1 drink = 12 oz beer, 5 oz wine, 1.5 oz liquor): rarely 1 glass of wine    Current adherence to recommended diet (renal dialysis): Good    Physical Activity  Works out at the fitness center 1-2 days a  week    MALNUTRITION  % Intake:  No decreased intake noted  % Weight Loss:  None noted  Subcutaneous Fat Loss:  None noted  Muscle Loss:  None noted  Fluid Accumulation/Edema:  None noted  Malnutrition Diagnosis: Patient does not meet two of the above criteria necessary for diagnosing malnutrition at this time in the context of chronic illness    Nutrition Prescription  Energy: 2025 - 2430 kcal/day    (25-30 kcal/kg for maintenance)     Protein: 97 -113 gm/day      (1.2-1.4 g/kg for HD/PD needs)         Fluid:  1 ml/kcal or per MD        Micronutrient:  Na+: <2000 mg/day  K+: 2027-0388 mg/day  Phos: 800-1000 mg/day          Nutrition Diagnosis:  Food and nutrition related knowledge deficit r/t pre kidney transplant eval AEB pt verbalized not hearing pre/post transplant diet guidelines.    Nutrition Intervention:  Nutrition education provided:  Discussed sodium intake , encouraged pt to continue to follow a 2000 mg Sodium restricted diet.    Reviewed post txp diet guidelines in brief (will review in further detail post txp):   (1) Review of proper food safety measures d/t immunosuppressant therapy post-op and increased risk for food-borne illness (2) Stressed importance of not taking any herbal/Chinese/alternative medicines or supplements post txp (d/t risk for rejection, unknown effects on the organs, potential interactions with immunosuppresants). (3) Med regimen and possible side effects    Patient Understanding: Pt verbalized understanding of education provided.  Expected Compliance: Good       Nutrition Goals:  1. Limit/Maintain Na+ <2000mg/day, K+: 2306-7842 mg/day and Phos: 800-1000 mg/day      Provided pt with contact info.   Time spent with patient: 15 minutes.  Siddharth Del Rio RD, LD

## 2017-07-31 NOTE — LETTER
2017        RE: Bro Barnes  [address]  [address] MB R2J 4A4     Dear Colleague,    Thank you for referring your patient, Bro Barnes, to the [facility] SOLID ORGAN TRANSPLANT at [facility]. Please see a copy of my visit note below.    Transplant Surgery Consult Note     Medical record number: [id number]  YOB: 1955,   Consult requested by Dr. Mills for evaluation of kidney transplant candidacy.    Assessment and Recommendations:Mr. Barnes appears to be a good candidate for kidney transplantation and has a excellent understanding of the risks and benefits of this approach to the management of renal failure. The following issues should be addressed prior to finalizing his transplant candidacy:     61 yo h/o cardiomyopathy and heart tx, doing well  CNI and Fsgs with dialysis dependence since 2016  100%pra  Listed in [place] bot DD and LD paired excahnge  Sister in law approved donor in [place]  Will need to register here.   List under all paired donor programs  That would be his best bet  In addition list for DD  No high KDPI at this time  May change in future depending on LD status and recip status  H/o lymphoceles in bilat groins post angio access        The majority of our visit was spent in counselling, discussing the medical and surgical risks of kidney transplantation. We discussed approximate wait time and how that is influenced by issues such as blood type and sensitization (PRA) and access to a living donor. I contrasted potential waiting time for living vs  donor kidneys from  normal (0-85%) or higher (%) kidney donor profile index (KDPI) donors and their associated outcomes. I would not recommend this individual to consider kidneys from high KDPI donors. The reason for this decision is best summarized as: patient strong preference. Potential surgical complications of kidney transplantation include bleeding, superficial or deep wound  complications (infection, hernia, lymphocele), ureteral anastomotic failure (leak or stenosis), graft thrombosis, need for reoperation and other issues such as cardiac complications, pneumonia, deep venous thrombosis, pulmonary embolism, post transplant diabetes and death. The potential for recurrent disease or need for retransplantation was also addressed. We discussed the possible need for ureteral stent (and subsequent removal), and the utility of protocol biopsy and laboratory studies to evaluate for rejection or recurrent disease. We discussed the risk of graft rejection, our center's average graft and patient survival rates, immunosuppression protocols, as well as the potential opportunity to participate in clinical trials.  We also discussed the average length of stay, recovery process, and posttransplant lab and monitoring protocol.  I emphasized the need for strict immunosuppression medication adherence and the potential for complications of immunosuppression such as skin cancer or lymphoma, as well as a very low but not zero risk of donor-derived disease transmission risks (infection, cancer). Mr. Barnes asked good questions and his candidacy will be reviewed at our Multidisciplinary Selection Committee. Thank you for the opportunity to participate in Mr. Barnes's care.      Total time: 45 minutes  Counselling time: 40 minutes    .    ---------------------------------------------------------------------------------------------------    HPI: Mr. 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 The patient is non-diabetic.       The patient is on dialysis.    Has potential kidney donors:  Yes .  Interested in participation in paired exchange if a donor is willing: Yes     The patient has the following pertinent history:       No    Yes  Dialysis:    [x]      [] via:       Blood Transfusion                  [x]      []  Number of units:   Most recently:  Pregnancy:    [x]       [] Number:       Previous Transplant:  [x]      [] Details:    Cancer    [x]      [] Comment:   Kidney stones   [x]      [] Comment:      Recurrent infections  [x]      []  Type:                  Bladder dysfunction  [x]      [] Cause:    Claudication   [x]      [] Distance:    Previous Amputation  [x]      [] Cause:     Chronic anticoagulation  [x]      [] Indication:   Judaism  [x]      []    Past Medical History:   Diagnosis Date     Anemia      Bacterial UTI     from bacterial prostatitis- no longer a problem     ESRD (end stage renal disease) on dialysis (H)      FSGS (focal segmental glomerulosclerosis)      Gammopathy      Heart transplant recipient (H) 2013     HTN (hypertension)      Hypothyroid      Idiopathic cardiomyopathy (H)      Migraines     improved with BB     Splenomegaly      Thrombocytopenia (H)      Tricuspid regurgitation     in transplanted heart     Past Surgical History:   Procedure Laterality Date     AICD insertion and removal       LAPAROTOMY EXPLORATORY  04/2013    lysis of adhesions     LVAD insertion and removal        lymphocele repair  Right      TRANSPLANT HEART RECIPIENT  2013     No family history on file.  Social History     Social History     Marital status:      Spouse name: N/A     Number of children: N/A     Years of education: N/A     Occupational History     Not on file.     Social History Main Topics     Smoking status: Never Smoker     Smokeless tobacco: Never Used     Alcohol use No     Drug use: Not on file     Sexual activity: No     Other Topics Concern     Not on file     Social History Narrative       ROS:   CONSTITUTIONAL:  No fevers or chills  EYES: negative for icterus  ENT:  negative for hearing loss, tinnitus and sore throat  RESPIRATORY:  negative for cough, sputum, dyspnea  CARDIOVASCULAR:  negative for chest pain Fatigue  GASTROINTESTINAL:  negative for nausea, vomiting, diarrhea or constipation  GENITOURINARY:  negative for incontinence,  dysuria, bladder emptying problems  HEME:  No easy bruising  INTEGUMENT:  negative for rash and pruritus  NEURO:  Negative for headache, seizure disorder  Allergies:   No Known Allergies  Medications:  Prescription Medications as of 7/31/2017             PROGRAF 1 MG PO CAPSULE Take 1 mg by mouth 2 times daily    CELLCEPT 500 MG PO TABLET Take 750 mg by mouth    candesartan (ATACAND) 4 MG TABS tablet Take 2 mg by mouth daily    ATORVASTATIN CALCIUM PO Take 10 mg by mouth daily    ASPIRIN PO Take 81 mg by mouth daily    OMEPRAZOLE PO Take 10 mg by mouth daily    VITAMIN D, CHOLECALCIFEROL, PO Take 2,000 Units by mouth daily    LEVOTHYROXINE SODIUM PO Take 25 mcg by mouth daily    Furosemide (LASIX PO) Take 40 mg by mouth        Exam:   Temp:  [98.4  F (36.9  C)] 98.4  F (36.9  C)  Pulse:  [96] 96  Resp:  [18] 18  BP: (115)/(73) 115/73  SpO2:  [98 %] 98 %  Appearance: in no apparent distress.   Skin: normal  Head and Neck: Normal, no rashes or jaundice  Respiratory: easy respirations, no audible wheezing.  Cardiovascular: RRR  Abdomen: flat, No distention       Diagnostics:   Recent Results (from the past 672 hour(s))   Lipid Profile [LAB18]    Collection Time: 07/31/17  7:06 AM   Result Value Ref Range    Cholesterol 102 <200 mg/dL    Triglycerides 76 <150 mg/dL    HDL Cholesterol 43 >39 mg/dL    LDL Cholesterol Calculated 43 <100 mg/dL    Non HDL Cholesterol 59 <130 mg/dL   Comprehensive metabolic panel [LAB17]    Collection Time: 07/31/17  7:06 AM   Result Value Ref Range    Sodium 134 133 - 144 mmol/L    Potassium 4.2 3.4 - 5.3 mmol/L    Chloride 94 94 - 109 mmol/L    Carbon Dioxide 31 20 - 32 mmol/L    Anion Gap 9 3 - 14 mmol/L    Glucose 106 (H) 70 - 99 mg/dL    Urea Nitrogen 48 (H) 7 - 30 mg/dL    Creatinine 8.02 (H) 0.66 - 1.25 mg/dL    GFR Estimate 7 (L) >60 mL/min/1.7m2    GFR Estimate If Black 8 (L) >60 mL/min/1.7m2    Calcium 8.9 8.5 - 10.1 mg/dL    Bilirubin Total 0.6 0.2 - 1.3 mg/dL    Albumin 3.9 3.4 -  5.0 g/dL    Protein Total 6.8 6.8 - 8.8 g/dL    Alkaline Phosphatase 105 40 - 150 U/L    ALT 24 0 - 70 U/L    AST 16 0 - 45 U/L   Phosphorus    Collection Time: 07/31/17  7:06 AM   Result Value Ref Range    Phosphorus 3.6 2.5 - 4.5 mg/dL   Prostate spec antigen screen [HNR7649]    Collection Time: 07/31/17  7:06 AM   Result Value Ref Range    PSA 0.68 0 - 4 ug/L   INR [RGA3390]    Collection Time: 07/31/17  7:06 AM   Result Value Ref Range    INR 1.10 0.86 - 1.14   Partial thromboplastin time [LAB56]    Collection Time: 07/31/17  7:06 AM   Result Value Ref Range    PTT 30 22 - 37 sec   Thrombin time [DKG350]    Collection Time: 07/31/17  7:06 AM   Result Value Ref Range    Thrombin Time 17.5 13.0 - 19.0 sec   CBC with platelets differential [KYG670]    Collection Time: 07/31/17  7:06 AM   Result Value Ref Range    WBC 4.2 4.0 - 11.0 10e9/L    RBC Count 2.87 (L) 4.4 - 5.9 10e12/L    Hemoglobin 9.0 (L) 13.3 - 17.7 g/dL    Hematocrit 28.2 (L) 40.0 - 53.0 %    MCV 98 78 - 100 fl    MCH 31.4 26.5 - 33.0 pg    MCHC 31.9 31.5 - 36.5 g/dL    RDW 13.2 10.0 - 15.0 %    Platelet Count 119 (L) 150 - 450 10e9/L    Diff Method Automated Method     % Neutrophils 59.4 %    % Lymphocytes 27.6 %    % Monocytes 11.3 %    % Eosinophils 1.0 %    % Basophils 0.5 %    % Immature Granulocytes 0.2 %    Nucleated RBCs 0 0 /100    Absolute Neutrophil 2.5 1.6 - 8.3 10e9/L    Absolute Lymphocytes 1.2 0.8 - 5.3 10e9/L    Absolute Monocytes 0.5 0.0 - 1.3 10e9/L    Absolute Eosinophils 0.0 0.0 - 0.7 10e9/L    Absolute Basophils 0.0 0.0 - 0.2 10e9/L    Abs Immature Granulocytes 0.0 0 - 0.4 10e9/L    Absolute Nucleated RBC 0.0    Hepatitis B core antibody [QFU2541]    Collection Time: 07/31/17  7:06 AM   Result Value Ref Range    Hepatitis B Core Liza Nonreactive NR   Hepatitis B Surface Antibody [HBY5970]    Collection Time: 07/31/17  7:06 AM   Result Value Ref Range    Hepatitis B Surface Antibody 15.97 (H) <8.00 m[IU]/mL   Hepatitis B surface  antigen [LNT447]    Collection Time: 07/31/17  7:06 AM   Result Value Ref Range    Hep B Surface Agn Nonreactive NR   Hepatitis C antibody [WPC581]    Collection Time: 07/31/17  7:06 AM   Result Value Ref Range    Hepatitis C Antibody  NR     Nonreactive   Assay performance characteristics have not been established for newborns,   infants, and children     HIV Antigen Antibody Combo Pretransplant    Collection Time: 07/31/17  7:06 AM   Result Value Ref Range    HIV Antigen Antibody Combo Pretransplant  NR     Nonreactive   HIV-1 p24 Ag & HIV-1/HIV-2 Ab Not Detected     ABO/Rh type and screen [CEU938]    Collection Time: 07/31/17  7:07 AM   Result Value Ref Range    ABO B     RH(D)  Pos     Antibody Screen Neg     Test Valid Only At       [facility]    Specimen Expires 08/03/2017    Routine UA with microscopic [BON9642]    Collection Time: 07/31/17  7:18 AM   Result Value Ref Range    Color Urine Misty     Appearance Urine Cloudy     Glucose Urine Negative NEG mg/dL    Bilirubin Urine Negative NEG    Ketones Urine 5 (A) NEG mg/dL    Specific Gravity Urine 1.017 1.003 - 1.035    Blood Urine Small (A) NEG    pH Urine 5.0 5.0 - 7.0 pH    Protein Albumin Urine >499 (A) NEG mg/dL    Urobilinogen mg/dL 0.0 0.0 - 2.0 mg/dL    Nitrite Urine Negative NEG    Leukocyte Esterase Urine Large (A) NEG    Source Midstream Urine     WBC Urine 151 (H) 0 - 2 /HPF    RBC Urine 18 (H) 0 - 2 /HPF    WBC Clumps Present (A) NEG /HPF    Bacteria Urine Few (A) NEG /HPF    Squamous Epithelial /HPF Urine 2 (H) 0 - 1 /HPF    Mucous Urine Present (A) NEG /LPF    Hyaline Casts 2 0 - 2 /LPF    Amorphous Crystals Few (A) NEG /HPF     No results found for: CPRA    Again, thank you for allowing me to participate in the care of your patient.      Sincerely,    MCKENZIE

## 2017-07-31 NOTE — MR AVS SNAPSHOT
After Visit Summary   7/31/2017    Bro Barnes    MRN: [id number]           Patient Information     Date Of Birth          1955        Visit Information        Provider Department      7/31/2017 11:15 AM Susanna Meyers MSW [facility] Solid Organ Transplant        Today's Diagnoses     Organ transplant candidate    -  1       Follow-ups after your visit        Your next 10 appointments already scheduled     Jul 31, 2017 11:15 AM CDT   (Arrive by 11:00 AM)   SOT SOCIAL WORK EVAL with NIYA Short   [facility] Solid Organ Transplant ([facility])    [address] [place] [id number]   [phone]            Jul 31, 2017  1:30 PM CDT   Lab with  LAB   [facility] Lab ([facility])    [address] [place] [id number]   [phone]            Jul 31, 2017  1:45 PM CDT   (Arrive by 1:30 PM)   XR CHEST 2 VIEWS with XR1   [facility] Xray ([facility])    [address] [place] [id number] [phone]           Please bring a list of your current medicines to your exam. (Include vitamins, minerals and over-thecounter medicines.) Leave your valuables at home.  Tell your doctor if there is a chance you may be pregnant.  You do not need to do anything special for this exam.            Jul 31, 2017  2:20 PM CDT   (Arrive by 2:05 PM)   ecg with  CV EKG   [facility] Xray ([facility])    [address] [place] [id number] [phone]            Jul 31, 2017  4:00 PM CDT   Ech Complete with UCECHCR2   [facility] Echo ([facility])    [address] [place] [id number] [phone]           1. Please bring or wear a comfortable two-piece outfit. 2. You may eat, drink and take your normal medicines. 3. For any questions that  cannot be answered, please contact the ordering physician              Who to contact     If you have questions or need follow up information about today's clinic visit or your schedule please contact [facility] SOLID ORGAN TRANSPLANT directly at [phone].  Normal or non-critical lab and imaging results will be communicated to you by MyChart, letter or phone within 4 business days after the clinic has received the results. If you do not hear from us within 7 days, please contact the clinic through MyChart or phone. If you have a critical or abnormal lab result, we will notify you by phone as soon as possible.  Submit refill requests through Spotzot or call your pharmacy and they will forward the refill request to us. Please allow 3 business days for your refill to be completed.          Additional Information About Your Visit        Spotzot Information     [employer] gives you secure access to your electronic health record. If you see a primary care provider, you can also send messages to your care team and make appointments. If you have questions, please call your primary care clinic.  If you do not have a primary care provider, please call [phone] and they will assist you.        Care EveryWhere ID     This is your Care EveryWhere ID. This could be used by other organizations to access your [place] medical records  [id number]         Blood Pressure from Last 3 Encounters:   07/31/17 115/73    Weight from Last 3 Encounters:   07/31/17 81.3 kg (179 lb 3.2 oz)              Today, you had the following     No orders found for display       Primary Care Provider Office Phone # Fax #    Eduardo Coleman [phone]       [address]  [place]        Equal Access to Services     [facility] : Hadii marianna hernandez hadasho Somelinda, waaxda luqadaha, qaybta diogo colon. So [facility] [phone].    ATENCIÓN: Jaxon flores  español, tiene a ortiz disposición servicios gratuitos de asistencia lingüística. [place] cortez [phone].    We comply with applicable federal civil rights laws and [place] laws. We do not discriminate on the basis of race, color, national origin, age, disability sex, sexual orientation or gender identity.            Thank you!     Thank you for choosing [facility] SOLID ORGAN TRANSPLANT  for your care. Our goal is always to provide you with excellent care. Hearing back from our patients is one way we can continue to improve our services. Please take a few minutes to complete the written survey that you may receive in the mail after your visit with us. Thank you!             Your Updated Medication List - Protect others around you: Learn how to safely use, store and throw away your medicines at www.disposemymeds.org.          This list is accurate as of: 7/31/17 11:04 AM.  Always use your most recent med list.                   Brand Name Dispense Instructions for use Diagnosis    ASPIRIN PO      Take 81 mg by mouth daily        ATORVASTATIN CALCIUM PO      Take 10 mg by mouth daily        candesartan 4 MG Tabs tablet    ATACAND     Take 2 mg by mouth daily        LASIX PO      Take 40 mg by mouth        LEVOTHYROXINE SODIUM PO      Take 25 mcg by mouth daily        mycophenolate tablet      Take 750 mg by mouth        OMEPRAZOLE PO      Take 10 mg by mouth daily        tacrolimus capsule      Take 1 mg by mouth 2 times daily        VITAMIN D (CHOLECALCIFEROL) PO      Take 2,000 Units by mouth daily

## 2017-07-31 NOTE — MR AVS SNAPSHOT
After Visit Summary   7/31/2017    Bro Barnes    MRN: [id number]           Patient Information     Date Of Birth          1955        Visit Information        Provider Department      7/31/2017 9:30 AM  PKE PATIENT 3 [facility] Solid Organ Transplant        Today's Diagnoses     Organ transplant candidate    -  1      Care Instructions    .tx          Follow-ups after your visit        Your next 10 appointments already scheduled     Jul 31, 2017  1:30 PM CDT   Lab with  LAB   [facility] Lab ([facility])    [address] [place] [id number] [phone]            Jul 31, 2017  1:45 PM CDT   (Arrive by 1:30 PM)   XR CHEST 2 VIEWS with UCXR1   [facility] Xray ([facility])    [address] [place] [id number] [phone]           Please bring a list of your current medicines to your exam. (Include vitamins, minerals and over-thecounter medicines.) Leave your valuables at home.  Tell your doctor if there is a chance you may be pregnant.  You do not need to do anything special for this exam.            Jul 31, 2017  2:20 PM CDT   (Arrive by 2:05 PM)   ecg with  CV EKG   [facility] Xray ([facility])    [address] [place] [phone] [phone]            Jul 31, 2017  4:00 PM CDT   Ech Complete with UCECHCR2   [facility] Echo ([facility])    [address] [place] [id number] [phone]           1. Please bring or wear a comfortable two-piece outfit. 2. You may eat, drink and take your normal medicines. 3. For any questions that cannot be answered, please contact the ordering physician              Who to contact     If you have questions or need follow up information about today's clinic visit or your schedule please contact [facility] SOLID ORGAN TRANSPLANT  directly at [phone].  Normal or non-critical lab and imaging results will be communicated to you by MyChart, letter or phone within 4 business days after the clinic has received the results. If you do not hear from us within 7 days, please contact the clinic through RACTIVhart or phone. If you have a critical or abnormal lab result, we will notify you by phone as soon as possible.  Submit refill requests through [employer] or call your pharmacy and they will forward the refill request to us. Please allow 3 business days for your refill to be completed.          Additional Information About Your Visit        RACTIVhart Information     [employer] gives you secure access to your electronic health record. If you see a primary care provider, you can also send messages to your care team and make appointments. If you have questions, please call your primary care clinic.  If you do not have a primary care provider, please call [phone] and they will assist you.        Care EveryWhere ID     This is your Care EveryWhere ID. This could be used by other organizations to access your [place] medical records  [id number]         Blood Pressure from Last 3 Encounters:   07/31/17 115/73    Weight from Last 3 Encounters:   07/31/17 81.3 kg (179 lb 3.2 oz)              Today, you had the following     No orders found for display       Primary Care Provider Office Phone # Fax #    Eduardo Coleman [phone]       [address]  [place]        Equal Access to Services     RITIKA MADDOX AH: Hadii marianna ku amelieo Somelinda, waaxda luqadaha, qaybta kaalmada adeegyada, diogo galloway. So [facility] [phone].    ATENCIÓN: Si habla español, tiene a ortiz disposición servicios gratuitos de asistencia lingüística. Llame al [phone].    We comply with applicable federal civil rights laws and [place] laws. We do not discriminate on the basis of race, color,  national origin, age, disability sex, sexual orientation or gender identity.            Thank you!     Thank you for choosing [facility] SOLID ORGAN TRANSPLANT  for your care. Our goal is always to provide you with excellent care. Hearing back from our patients is one way we can continue to improve our services. Please take a few minutes to complete the written survey that you may receive in the mail after your visit with us. Thank you!             Your Updated Medication List - Protect others around you: Learn how to safely use, store and throw away your medicines at www.disposemymeds.org.          This list is accurate as of: 7/31/17 11:33 AM.  Always use your most recent med list.                   Brand Name Dispense Instructions for use Diagnosis    ASPIRIN PO      Take 81 mg by mouth daily        ATORVASTATIN CALCIUM PO      Take 10 mg by mouth daily        candesartan 4 MG Tabs tablet    ATACAND     Take 2 mg by mouth daily        LASIX PO      Take 40 mg by mouth        LEVOTHYROXINE SODIUM PO      Take 25 mcg by mouth daily        mycophenolate tablet      Take 750 mg by mouth        OMEPRAZOLE PO      Take 10 mg by mouth daily        tacrolimus capsule      Take 1 mg by mouth 2 times daily        VITAMIN D (CHOLECALCIFEROL) PO      Take 2,000 Units by mouth daily

## 2017-07-31 NOTE — LETTER
7/31/2017       RE: Bro Barnes  [address] [facility] [address]  [place] MB R2J 4A4     Dear Colleague,    Thank you for referring your patient, Bro Barnes, to the [facility]ID [facility]GAN TRANSPL[facility] at [facility]. Please see a copy of my visit note below.    Assessment and Plan:  1. Kidney transplant evaluation - patient is a good candidate overall. Benefits of a living donor transplant were discussed.  2. 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 He is currently listed in [place] but is highly sensitized. He has a potential living donor and they are currently listed in paired exchange in [place].   3. Idiopathic cardiomyopathy s/p heart transplant January 2013 - pathology of explanted heart with findings suggestive of arrhythmogenic right ventricular cardiomyopathy. He has known severe tricuspid insufficiency, which is demonstrated on today's ECHO. EF 60-65%. Current immunosuppression is with MMF and tacrolimus. He had a coronary angiogram in November 2016 without any flow limiting lesions, is fairly physically active, and is without exertional symptoms. Will review cardiac status and tricuspid insufficiency with the multidisciplinary committee to see if further cardiac risk assessment will be needed.   4. Abnormal paraproteinemia work up 2013 - he reports having heme/onc evaluation in [place] and these records should be obtained. Repeat labs today are summarized below and will need to be reviewed with the multidisciplinary committee.    -Urine immunofixation with a monoclonal kappa free light chain and possible very small monoclonal lambda free light chain and UPEP with a monoclonal peak of 2.3   -Serum immunofixation without monoclonal protein and SPEP without monoclonal peak   -Elevated serum FLCs with a normal ratio  5. Left renal cyst - previously  imaged locally in [place] and noted to have increased in size and complexity in April 2016. Renal US obtained here today shows a 4.0 cm left upper pole renal cyst with a single 3 mm avascular septation. Most recent outside renal imaging is being obtained for comparison.   6. Health maintenance - he is due for colonoscopy    Discussed the risks and benefits of a transplant, including the risk of surgery and immunosuppression medications.  Patient's overall evaluation will be discussed in the Transplant Program's regular meeting with a final recommendation on the patient's suitability for transplant to be made at that time.  Patient was seen in conjunction with Dr. Rowdy Suarez as part of a shared visit.      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 Elbert Cordero for evaluation as a potential kidney transplant recipient.    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 Prior to that, he was otherwise healthy. He initially had an ICD placed in 2008 following a cardiac arrest and then later an LVAD in January 2012 as a bridge to transplant. Pathology from the explanted heart showed features suggestive of arrhythmogenic right ventricular cardiomyopathy. Prior to transplant, his creatinine was noted to have been 1.4-1.8 mg/dl, which increased to around 2.0-2.2 mg/dl post-transplant. His creatinine continued to climb, and he developed worsening proteinuria, which prompted a kidney biopsy in April 2016 that showed chronic sclerosing glomerulopathy and secondary FSGS along with severe chronic and vascular changes. Unfortunately, his kidney function continued to progress, and he initiated dialysis in October 2016. He currently dialyzes every other night  "via home hemodialysis, which he reports is going well. He is currently listed for kidney transplant in [place], but is highly sensitized, and is here to increase his options for transplant. He has a potential living donor who has been evaluated in [place], and they are enrolled in paired exchange. His current immunosuppression following heart transplant consists of MMF and tacrolimus. His post-transplant course was complicated by CMV viremia with neutropenia treated with antiviral therapy and GCSF in 2013. He reports doing well since without any major complications. He continues to work part-time and stays physically active by exercising at the gym 1-2 days per week. He denies chest pain, SOB, or claudication symptoms with exertion. He has known severe tricuspid regurgitation of the transplanted heart. His last ECHO in February 2017 showed an EF to be greater than 60%. He also had a coronary angiogram in November 2016 that did not demonstrate any flow limiting lesions. He continues to follow with his local heart transplant team in [place]. In 2013, he was found to have IgG restriction on SPEP and \"broad lambda light chain restriction most suggestive of a monoclonal light chain\" on UPEP, although the actual lab reports are not currently available for review. Repeat labs in April 2016 showed that serum FLC were elevated with a normal ratio. SPEP was normal and did not demonstrate any monoclonal protein. He reports being evaluated by heme/onc in 2014 but these records are not currently available for review. A left renal cyst has been followed, he believes over the course of the past 5 years, and was noted to have been present in April 2016 but without a solid nodular component that had previously been seen on earlier imaging. Overall, he is feeling okay. He reports a decent energy level and a stable appetite without nausea, vomiting, or diarrhea. He still makes some urine and denies dysuria, hematuria, or trouble emptying " his bladder. No fevers, sweats, chills, or recent illness.         Kidney Disease Hx:        Kidney Disease Dx: chronic sclerosing glomerulopathy and secondary FSGS       Biopsy Proven: Yes; see above. Of note, no monoclonal protein deposition or evidence of amyloid.         On Dialysis: Yes, Date initiated: October 2016 and Dialysis Type: Home HD;       Primary Nephrologist: Dr. Mills          Medical Hx:       h/o HTN: Yes         h/o DM:  No       h/o Protein in Urine: Yes        h/o Blood in Urine:  No       h/o Kidney Stones:  No       h/o UTI: Yes, but not in the past few years       h/o Chronic NSAID Use: No         Previous Transplant Hx:        Yes; heart transplant 2013         Transplant Sensitization Hx:       Previous Tx: Yes       Blood Transfusion: Yes              Uremic Symptoms:       Fatigue: No; Cold: No; Nausea: No; Poor Appetite: No; Metallic Taste: No; Edema: No;         Cardiovascular Hx:       h/o Cardiac Issues: Yes       Exercise Tolerance: no chest pain or shortness of breath with exertion.         Health Maintenance:       Colonoscopy: Not up to date         Potential Donor(s): Yes    ROS:  A comprehensive review of systems was obtained and negative, except as noted in the HPI or PMH.    PMH:   Medical record was reviewed and PMH was discussed with patient and noted below.  Past Medical History:   Diagnosis Date     Anemia in chronic kidney disease      Bacterial UTI     from bacterial prostatitis- no longer a problem     ESRD (end stage renal disease) on dialysis (H)      FSGS (focal segmental glomerulosclerosis)      Gammopathy      Heart transplant recipient (H) 2013     HTN (hypertension)      Hypothyroid      Idiopathic cardiomyopathy (H)      Migraines     improved with BB     Renal cyst      Splenomegaly      Thrombocytopenia (H)      Tricuspid regurgitation     in transplanted heart       PSH:   Past Surgical History:   Procedure Laterality Date     AICD insertion and removal    "    LAPAROTOMY EXPLORATORY  04/2013    lysis of adhesions     LVAD insertion and removal        lymphocele repair  Right      TRANSPLANT HEART RECIPIENT  2013     Personal or family history of bleeding or anesthesia problems: No    Family Hx:  Family History   Problem Relation Age of Onset     DIABETES Mother      Hypertension Mother      Arrhythmia Mother      Bladder Cancer Mother      Prostate Cancer Father      Hypertension Father      DIABETES Father      Hypertension Brother      Arrhythmia Other        Personal Hx:   Social History     Social History     Marital status:      Spouse name: N/A     Number of children: N/A     Years of education: N/A     Occupational History     Not on file.     Social History Main Topics     Smoking status: Never Smoker     Smokeless tobacco: Never Used     Alcohol use No     Drug use: Not on file     Sexual activity: No     Other Topics Concern     Not on file     Social History Narrative       Allergies:  No Known Allergies    Medications:  Prior to Admission medications    Not on File       Vitals:  /73  Pulse 96  Temp 98.4  F (36.9  C) (Oral)  Resp 18  Ht 1.72 m (5' 7.72\")  Wt 81.3 kg (179 lb 3.2 oz)  SpO2 98%  BMI 27.48 kg/m2    Exam:  GENERAL APPEARANCE: alert and no distress  HENT: mouth without ulcers or lesions  LYMPHATICS: no cervical or supraclavicular nodes  RESP: lungs clear to auscultation - no rales, rhonchi or wheezes  CV: regular rhythm, normal rate, no rub, no murmur  EDEMA: no LE edema bilaterally  ABDOMEN: soft, nondistended, nontender, bowel sounds normal  MS: extremities normal - no gross deformities noted, no evidence of inflammation in joints, no muscle tenderness  SKIN: no rash    Results:   Recent Results (from the past 336 hour(s))   HLA-AB Typing pcr/ssop    Collection Time: 07/31/17  6:31 AM   Result Value Ref Range    ABTest Method SSOP     A* locus A*01     A* A*33     B* locus B*14 (65)     B* B*58     C* locus C*07     C* C*08 "     Bw-1 Bw*6     Bw-2 Bw*4    HLA-DR/DQ Typing pcr/ssop    Collection Time: 07/31/17  6:31 AM   Result Value Ref Range    Drsso Test Method SSOP     DRB1* locus DRB1*01     DRB1* DRB1*13     DRB3* locus DRB3*03     DQB1* locus DQB1*05     DQB1* DQB1*06     DQA1*locus DQA1*01     DPB1* DPB1*04:02     DPB1*locus DPB1*11:01     DPA1* DPA1*01:03     DPA1*locus DPA1*02:01    Lipid Profile [LAB18]    Collection Time: 07/31/17  7:06 AM   Result Value Ref Range    Cholesterol 102 <200 mg/dL    Triglycerides 76 <150 mg/dL    HDL Cholesterol 43 >39 mg/dL    LDL Cholesterol Calculated 43 <100 mg/dL    Non HDL Cholesterol 59 <130 mg/dL   Comprehensive metabolic panel [LAB17]    Collection Time: 07/31/17  7:06 AM   Result Value Ref Range    Sodium 134 133 - 144 mmol/L    Potassium 4.2 3.4 - 5.3 mmol/L    Chloride 94 94 - 109 mmol/L    Carbon Dioxide 31 20 - 32 mmol/L    Anion Gap 9 3 - 14 mmol/L    Glucose 106 (H) 70 - 99 mg/dL    Urea Nitrogen 48 (H) 7 - 30 mg/dL    Creatinine 8.02 (H) 0.66 - 1.25 mg/dL    GFR Estimate 7 (L) >60 mL/min/1.7m2    GFR Estimate If Black 8 (L) >60 mL/min/1.7m2    Calcium 8.9 8.5 - 10.1 mg/dL    Bilirubin Total 0.6 0.2 - 1.3 mg/dL    Albumin 3.9 3.4 - 5.0 g/dL    Protein Total 6.8 6.8 - 8.8 g/dL    Alkaline Phosphatase 105 40 - 150 U/L    ALT 24 0 - 70 U/L    AST 16 0 - 45 U/L   Phosphorus    Collection Time: 07/31/17  7:06 AM   Result Value Ref Range    Phosphorus 3.6 2.5 - 4.5 mg/dL   Cardiolipin Liza IgG and IgM [MAP2823]    Collection Time: 07/31/17  7:06 AM   Result Value Ref Range    Cardiolipin Antibody IgG <1.6  Negative   0.0 - 19.9 GPL-U/mL    Cardiolipin Antibody IgM 0.4 0.0 - 19.9 MPL-U/mL   Prostate spec antigen screen [USC5569]    Collection Time: 07/31/17  7:06 AM   Result Value Ref Range    PSA 0.68 0 - 4 ug/L   M Tuberculosis by Quantiferon [[id number]]    Collection Time: 07/31/17  7:06 AM   Result Value Ref Range    M Tuberculosis Result Negative NEG    M Tuberculosis Antigen Value  0.00 IU/mL   INR [[id number]]    Collection Time: 07/31/17  7:06 AM   Result Value Ref Range    INR 1.10 0.86 - 1.14   Partial thromboplastin time [LAB56]    Collection Time: 07/31/17  7:06 AM   Result Value Ref Range    PTT 30 22 - 37 sec   Thrombin time [PXE741]    Collection Time: 07/31/17  7:06 AM   Result Value Ref Range    Thrombin Time 17.5 13.0 - 19.0 sec   Lupus Anticoagulant Panel ([id number])    Collection Time: 07/31/17  7:06 AM   Result Value Ref Range    Lupus Result  NEG     Negative  (Note)  COMMENTS:  The INR is normal.  APTT ratio is normal.  DRVVT Screen ratio is normal.  Thrombin time is normal.  NEGATIVE TEST; A LUPUS ANTICOAGULANT WAS NOT DETECTED IN THIS  SPECIMEN WITHIN THE LIMITS OF THE TESTING REPERTOIRE.  If the clinical picture is strongly suggestive of an antiphospholipid  syndrome, recommend anticardiolipin and beta-2-glycoprotein (IgG and  IgM) antibody tests.  Yesica Mondragon M.D.  [phone]  8/1/2017    APTT:       Ratio  Patient  =  1.14  1:2 Mix  =  N/A  Reference:  Negative: Less than or equal to 1.16  Positive: Greater than or equal to 1.17     DILUTE VON VIPER VENOM TEST:  Screen Ratio = 1.07   Normal is less than 1.21       CBC with platelets differential [PLO306]    Collection Time: 07/31/17  7:06 AM   Result Value Ref Range    WBC 4.2 4.0 - 11.0 10e9/L    RBC Count 2.87 (L) 4.4 - 5.9 10e12/L    Hemoglobin 9.0 (L) 13.3 - 17.7 g/dL    Hematocrit 28.2 (L) 40.0 - 53.0 %    MCV 98 78 - 100 fl    MCH 31.4 26.5 - 33.0 pg    MCHC 31.9 31.5 - 36.5 g/dL    RDW 13.2 10.0 - 15.0 %    Platelet Count 119 (L) 150 - 450 10e9/L    Diff Method Automated Method     % Neutrophils 59.4 %    % Lymphocytes 27.6 %    % Monocytes 11.3 %    % Eosinophils 1.0 %    % Basophils 0.5 %    % Immature Granulocytes 0.2 %    Nucleated RBCs 0 0 /100    Absolute Neutrophil 2.5 1.6 - 8.3 10e9/L    Absolute Lymphocytes 1.2 0.8 - 5.3 10e9/L    Absolute Monocytes 0.5 0.0 - 1.3 10e9/L    Absolute Eosinophils  0.0 0.0 - 0.7 10e9/L    Absolute Basophils 0.0 0.0 - 0.2 10e9/L    Abs Immature Granulocytes 0.0 0 - 0.4 10e9/L    Absolute Nucleated RBC 0.0    HLA Typing Complete SOT Recipient    Collection Time: 07/31/17  7:06 AM   Result Value Ref Range    HLA Typing Complete SOT Recipient       Specimen received - Immunology report to follow upon completion.   PRA Single Antigen IgG Antibody    Collection Time: 07/31/17  7:06 AM   Result Value Ref Range    PRA Single Antigen IgG Antibody       Specimen received - Immunology report to follow upon completion.   BK virus PCR quantitative [[id number]]    Collection Time: 07/31/17  7:06 AM   Result Value Ref Range    BK Virus Specimen Plasma     BK Virus Result BK Virus DNA Not Detected BKNEG copies/mL    BK Virus Log  <2.7 Log copies/mL     Not Calculated   The Real-Time quantitative BK Virus assay was developed and its performance   characteristics determined by the [facility] at   the [facility] in [place], [place]. The   primers and probes for each analyte are Analyte Specific Reagents (ASRs)   manufactured by [employer].   ASRs are used in many laboratory tests necessary for standard medical care and   generally do not require U.S. Food and Drug Administration approval. The FDA   has determined that such clearance or approval is not necessary.   This test is used for clinical purposes. It should not be regarded as   investigational or for research. This laboratory is certified under the   Clinical Laboratory Improvement Amendments of 1988 (CLIA-88) as qualified to   perform high complexity clinical laboratory testing.     CMV Antibody IgG [[id number]]    Collection Time: 07/31/17  7:06 AM   Result Value Ref Range    CMV Antibody IgG (H) 0.0 - 0.8 AI     >8.0  Positive   Antibody index (AI) values reflect qualitative changes in antibody   concentration that cannot be directly associated with clinical condition or   disease  state.     EBV Capsid Antibody IgG [[id number]]    Collection Time: 07/31/17  7:06 AM   Result Value Ref Range    EBV Capsid Antibody IgG  0.0 - 0.8 AI     <0.2  No detectable antibody.   Antibody index (AI) values reflect qualitative changes in antibody   concentration that cannot be directly associated with clinical condition or   disease state.     Hepatitis B core antibody [[id number]]    Collection Time: 07/31/17  7:06 AM   Result Value Ref Range    Hepatitis B Core Liza Nonreactive NR   Hepatitis B Surface Antibody [[id number]]    Collection Time: 07/31/17  7:06 AM   Result Value Ref Range    Hepatitis B Surface Antibody 15.97 (H) <8.00 m[IU]/mL   Hepatitis B surface antigen [UFE264]    Collection Time: 07/31/17  7:06 AM   Result Value Ref Range    Hep B Surface Agn Nonreactive NR   Hepatitis C antibody [HCI456]    Collection Time: 07/31/17  7:06 AM   Result Value Ref Range    Hepatitis C Antibody  NR     Nonreactive   Assay performance characteristics have not been established for newborns,   infants, and children     HIV Antigen Antibody Combo Pretransplant    Collection Time: 07/31/17  7:06 AM   Result Value Ref Range    HIV Antigen Antibody Combo Pretransplant  NR     Nonreactive   HIV-1 p24 Ag & HIV-1/HIV-2 Ab Not Detected     Anti Treponema [[id number]]    Collection Time: 07/31/17  7:06 AM   Result Value Ref Range    Treponema pallidum Antibody Negative NEG   Varicella Zoster Virus Antibody IgG [[id number]]    Collection Time: 07/31/17  7:06 AM   Result Value Ref Range    Varicella Zoster Virus Antibody IgG 6.1 (H) 0.0 - 0.8 AI   Factor 2 and 5 mutation analysis    Collection Time: 07/31/17  7:06 AM   Result Value Ref Range    Copath Report       Patient Name: MIKE ELIZALDE  MR#: [id number]  Specimen #: [id number]  Collected: 7/31/2017 07:06  Received: 7/31/2017 11:21  Reported: 8/1/2017 15:44  Ordering Phy(s): KAREN FRITZ    For improved result formatting, select 'View Enhanced Report Format'  under Linked  Documents section.  _________________________________________    TEST(S) REQUESTED:  Factor 5 Leiden and Factor 2 by PCR    SPECIMEN DESCRIPTION:  Blood    METHODOLOGY:   The regions of genomic DNA containing the S7754E Factor 5  gene mutation (Factor V Leiden) and the Factor 2(Prothrombin S13823V)  gene mutation were simultaneously amplified using the polymerase chain  reaction.  The amplified products were digested with restriction  endonuclease TaqI and products were analyzed by gel electrophoresis.    RESULTS:    Factor V 1691G>A (Leiden)  RESULTS:  Mutation analyzed:     1691G>A  Factor V 1691G>A (Leiden)  Interpretation:      ABSENT  Factor V 1691G>A (Leiden) mutation  genotype:      G/G    FACTOR 2/PROTHROMBIN  RESULTS:  Mutation analyzed:     11697N>A  Factor 2 Mutation Interpretation:      ABSENT  Factor 2 Mutation genotype:      G/G    INTERPRETATION:  The patient is negative for the Factor V 1691G>A (Leiden) and negative  for the Factor 2 mutation.    COMMENTS:  If a patient is the recipient of an allogeneic bone marrow transplant,  this test must be done on a pre-transplant sample or buccal swab.  A  previous allogeneic bone marrow transplant will interfere with test  results.  Call the [facility] Lab([phone]) for  instructions on sample collection for these patients.    This test was developed and its performance characteristics determined  by the [facility],  [facility]  Laboratory. It has not been cleared or approved by the FDA. The  laboratory is regulated under CLIA as qualified to perform  high-complexity testing. This test is used for clinical purposes. It  should not be regarded as investigational or for research.    A reside nt/fellow in an accredited training program was involved in the  selection of testing, review of laboratory data, and/or interpretation  of this case.  I, as the senior physician, attest that I: (i)  confirmed  appropriate testing, (ii) examined the relevant raw data for the  specimen(s); and (iii) rendered or confirmed the interpretation(s).    Electronically Signed Out By:  Marjorie Lanza M.D. [facility]    CPT Codes:  A: 26391-D6WUUN, 06220-T0GREN, -ZGRSLZ(2)    TESTING LAB LOCATION:  [address] [address] [phone]    COLLECTION SITE:  Client:  [facility]  Location:  [facility] (B)     Protein electrophoresis    Collection Time: 07/31/17  7:06 AM   Result Value Ref Range    Albumin Fraction 4.3 3.7 - 5.1 g/dL    Alpha 1 Fraction 0.3 0.2 - 0.4 g/dL    Alpha 2 Fraction 0.6 0.5 - 0.9 g/dL    Beta Fraction 0.6 0.6 - 1.0 g/dL    Gamma Fraction 0.9 0.7 - 1.6 g/dL    Monoclonal Peak 0.0 0.0 g/dL    ELP Interpretation:       Essentially normal electrophoretic pattern, no monoclonal protein seen.   Pathological significance requires clinical correlation.   NANCY Saleem M.D., Ph.D   Pathologist ([phone])     Magnet and lambda light chain    Collection Time: 07/31/17  7:06 AM   Result Value Ref Range    Kappa Free Lt Chain 9.02 (H) 0.33 - 1.94 mg/dL    Lambda Free Lt Chain 10.60 (H) 0.57 - 2.63 mg/dL    Kappa Lambda Ratio 0.49 0.26 - 1.65   Protein Immunofixation Serum    Collection Time: 07/31/17  7:06 AM   Result Value Ref Range    Immunofixation ELP       No monoclonal protein seen on immunofixation.  Pathological significance   requires clinical correlation.   NANCY Saleem M.D., Ph.D   Pathologist ([phone])      IGG 1110 695 - 1620 mg/dL    IGA 66 (L) 70 - 380 mg/dL    IGM 38 (L) 60 - 265 mg/dL   ABO/Rh type and screen [GRC706]    Collection Time: 07/31/17  7:07 AM   Result Value Ref Range    ABO B     RH(D)  Pos     Antibody Screen Neg     Test Valid Only At       [facility]    Specimen Expires 08/03/2017    Antibody titer  red cell [[id number]]    Collection Time: 07/31/17  7:07 AM   Result Value Ref Range    Antibody Titer Anti A IgM 16, IgG 64    ABO Subtyping [[id number]]    Collection Time: 07/31/17  7:07 AM   Result Value Ref Range    Antigen Type Canceled, Test credited     Blood Bank Comment       Patient not ABO type A or AB. A subtype not applicable. 07/31/17 1440 MT   Routine UA with microscopic [[id number]]    Collection Time: 07/31/17  7:18 AM   Result Value Ref Range    Color Urine Misty     Appearance Urine Cloudy     Glucose Urine Negative NEG mg/dL    Bilirubin Urine Negative NEG    Ketones Urine 5 (A) NEG mg/dL    Specific Gravity Urine 1.017 1.003 - 1.035    Blood Urine Small (A) NEG    pH Urine 5.0 5.0 - 7.0 pH    Protein Albumin Urine >499 (A) NEG mg/dL    Urobilinogen mg/dL 0.0 0.0 - 2.0 mg/dL    Nitrite Urine Negative NEG    Leukocyte Esterase Urine Large (A) NEG    Source Midstream Urine     WBC Urine 151 (H) 0 - 2 /HPF    RBC Urine 18 (H) 0 - 2 /HPF    WBC Clumps Present (A) NEG /HPF    Bacteria Urine Few (A) NEG /HPF    Squamous Epithelial /HPF Urine 2 (H) 0 - 1 /HPF    Mucous Urine Present (A) NEG /LPF    Hyaline Casts 2 0 - 2 /LPF    Amorphous Crystals Few (A) NEG /HPF   Urine Culture Aerobic Bacterial    Collection Time: 07/31/17  7:18 AM   Result Value Ref Range    Specimen Description Unspecified Urine     Culture Micro       <10,000 colonies/mL urogenital guanakito  Susceptibility testing not routinely done      Micro Report Status FINAL 08/01/2017    EKG 12-lead, tracing only [EKG1]    Collection Time: 07/31/17  1:35 PM   Result Value Ref Range    Interpretation ECG Click View Image link to view waveform and result    ABO type [[id number]]    Collection Time: 07/31/17  1:48 PM   Result Value Ref Range    ABO B     RH(D)  Pos     Specimen Expires 08/03/2017    Protein electrophoresis random urine    Collection Time: 07/31/17  1:55 PM   Result Value Ref Range    Albumin Fraction Urine 67.2 (H) 0 %    Alpha 1 Fraction  Urine 12.9 (H) 0 %    Alpha 2 Fraction Urine 2.3 (H) 0 %    Beta Fraction Urine 2.1 (H) 0 %    Gamma Fraction Urine 15.5 (H) 0 %    Monoclonal Peak Urine 2.3 (H) 0% %    ELP Interpretation Urine       Albumin and globulins seen. A very small monoclonal protein (2.3 %) is seen in   the  gamma fraction.  See immunofixation report on this sample. Pathological   significance requires clinical correlation. NANYC Saleem M.D., Ph.D.,   Pathologist ()     Protein immunofixation urine    Collection Time: 07/31/17  1:55 PM   Result Value Ref Range    Immunofix ELP Urine       (Note)  Monoclonal free immunoglobulin light chain of kappa chain type.  Possible very small monoclonal free immunoglobulin light chain of  lambda chain type. Pathological significance requires clinical  correlation.  NANCY Saleem M.D., Ph.D., Pathologist ([phone]).       US Renal Complete    Collection Time: 08/01/17  8:22 AM   Result Value Ref Range    Radiologist flags Renal ultrasound        Patient was seen by myself, Dr. Rowdy Suarez, in conjunction with Cyndee Matt PA-C as part of a shared visit.    I personally reviewed past medical and surgical history, vital signs, medications and labs.  Present and past medical history, along with significant physical exam findings were all reviewed with VIDHI.    My pop findings:  Bro Barnes is 62 year old, who presents for Kidney transplant evaluation.    Key management decisions made by me and discussed with VIDHI:  ESRD secondary to unknown etiology vs CNI   Chronic immunosuppression   Heart transplant   Pyuria with negative culture. since Q-gold is negative, this can be attributed to minimal UOP.     Ban Sylvestersanty appears to be a reasonable candidate.   - Currently on immunosuppression for his heart transplant. At this point with the severe TR, I would like a formal assessment of his PAP. If PAP> 60 will need to discuss further options.  - Complex renal cyst with variable  progression. Would like formal urology input giving his on going immunosuppression.         Again, thank you for allowing me to participate in the care of your patient.      Sincerely,    MCKENZIE

## 2017-07-31 NOTE — PROGRESS NOTES
Assessment and Plan:  1. Kidney transplant evaluation - patient is a good candidate overall. Benefits of a living donor transplant were discussed.  2. 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 He is currently listed in [place] but is highly sensitized. He has a potential living donor and they are currently listed in paired exchange in [place].   3. Idiopathic cardiomyopathy s/p heart transplant January 2013 - pathology of explanted heart with findings suggestive of arrhythmogenic right ventricular cardiomyopathy. He has known severe tricuspid insufficiency, which is demonstrated on today's ECHO. EF 60-65%. Current immunosuppression is with MMF and tacrolimus. He had a coronary angiogram in November 2016 without any flow limiting lesions, is fairly physically active, and is without exertional symptoms. Will review cardiac status and tricuspid insufficiency with the multidisciplinary committee to see if further cardiac risk assessment will be needed.   4. Abnormal paraproteinemia work up 2013 - he reports having heme/onc evaluation in [place] and these records should be obtained. Repeat labs today are summarized below and will need to be reviewed with the multidisciplinary committee.    -Urine immunofixation with a monoclonal kappa free light chain and possible very small monoclonal lambda free light chain and UPEP with a monoclonal peak of 2.3   -Serum immunofixation without monoclonal protein and SPEP without monoclonal peak   -Elevated serum FLCs with a normal ratio  5. Left renal cyst - previously imaged locally in [place] and noted to have increased in size and complexity in April 2016. Renal US obtained here today shows a 4.0 cm left upper pole renal cyst with a single 3 mm avascular septation. Most recent outside renal imaging is being obtained for comparison.   6. Health  maintenance - he is due for colonoscopy    Discussed the risks and benefits of a transplant, including the risk of surgery and immunosuppression medications.  Patient's overall evaluation will be discussed in the Transplant Program's regular meeting with a final recommendation on the patient's suitability for transplant to be made at that time.  Patient was seen in conjunction with Dr. Rowdy Suarez as part of a shared visit.      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 Elbert Cordero for evaluation as a potential kidney transplant recipient.    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 Prior to that, he was otherwise healthy. He initially had an ICD placed in 2008 following a cardiac arrest and then later an LVAD in January 2012 as a bridge to transplant. Pathology from the explanted heart showed features suggestive of arrhythmogenic right ventricular cardiomyopathy. Prior to transplant, his creatinine was noted to have been 1.4-1.8 mg/dl, which increased to around 2.0-2.2 mg/dl post-transplant. His creatinine continued to climb, and he developed worsening proteinuria, which prompted a kidney biopsy in April 2016 that showed chronic sclerosing glomerulopathy and secondary FSGS along with severe chronic and vascular changes. Unfortunately, his kidney function continued to progress, and he initiated dialysis in October 2016. He currently dialyzes every other night via home hemodialysis, which he reports is going well. He is currently listed for kidney transplant in [place], but is highly sensitized, and is here to increase his options for transplant. He has a potential living donor who has been evaluated in [place], and they are enrolled in  "paired exchange. His current immunosuppression following heart transplant consists of MMF and tacrolimus. His post-transplant course was complicated by CMV viremia with neutropenia treated with antiviral therapy and GCSF in 2013. He reports doing well since without any major complications. He continues to work part-time and stays physically active by exercising at the gym 1-2 days per week. He denies chest pain, SOB, or claudication symptoms with exertion. He has known severe tricuspid regurgitation of the transplanted heart. His last ECHO in February 2017 showed an EF to be greater than 60%. He also had a coronary angiogram in November 2016 that did not demonstrate any flow limiting lesions. He continues to follow with his local heart transplant team in [place]. In 2013, he was found to have IgG restriction on SPEP and \"broad lambda light chain restriction most suggestive of a monoclonal light chain\" on UPEP, although the actual lab reports are not currently available for review. Repeat labs in April 2016 showed that serum FLC were elevated with a normal ratio. SPEP was normal and did not demonstrate any monoclonal protein. He reports being evaluated by heme/onc in 2014 but these records are not currently available for review. A left renal cyst has been followed, he believes over the course of the past 5 years, and was noted to have been present in April 2016 but without a solid nodular component that had previously been seen on earlier imaging. Overall, he is feeling okay. He reports a decent energy level and a stable appetite without nausea, vomiting, or diarrhea. He still makes some urine and denies dysuria, hematuria, or trouble emptying his bladder. No fevers, sweats, chills, or recent illness.         Kidney Disease Hx:        Kidney Disease Dx: chronic sclerosing glomerulopathy and secondary FSGS       Biopsy Proven: Yes; see above. Of note, no monoclonal protein deposition or evidence of amyloid.         On " Dialysis: Yes, Date initiated: October 2016 and Dialysis Type: Home HD;       Primary Nephrologist: Dr. Mills          Medical Hx:       h/o HTN: Yes         h/o DM:  No       h/o Protein in Urine: Yes        h/o Blood in Urine:  No       h/o Kidney Stones:  No       h/o UTI: Yes, but not in the past few years       h/o Chronic NSAID Use: No         Previous Transplant Hx:        Yes; heart transplant 2013         Transplant Sensitization Hx:       Previous Tx: Yes       Blood Transfusion: Yes              Uremic Symptoms:       Fatigue: No; Cold: No; Nausea: No; Poor Appetite: No; Metallic Taste: No; Edema: No;         Cardiovascular Hx:       h/o Cardiac Issues: Yes       Exercise Tolerance: no chest pain or shortness of breath with exertion.         Health Maintenance:       Colonoscopy: Not up to date         Potential Donor(s): Yes    ROS:  A comprehensive review of systems was obtained and negative, except as noted in the HPI or PMH.    PMH:   Medical record was reviewed and PMH was discussed with patient and noted below.  Past Medical History:   Diagnosis Date     Anemia in chronic kidney disease      Bacterial UTI     from bacterial prostatitis- no longer a problem     ESRD (end stage renal disease) on dialysis (H)      FSGS (focal segmental glomerulosclerosis)      Gammopathy      Heart transplant recipient (H) 2013     HTN (hypertension)      Hypothyroid      Idiopathic cardiomyopathy (H)      Migraines     improved with BB     Renal cyst      Splenomegaly      Thrombocytopenia (H)      Tricuspid regurgitation     in transplanted heart       PSH:   Past Surgical History:   Procedure Laterality Date     AICD insertion and removal       LAPAROTOMY EXPLORATORY  04/2013    lysis of adhesions     LVAD insertion and removal        lymphocele repair  Right      TRANSPLANT HEART RECIPIENT  2013     Personal or family history of bleeding or anesthesia problems: No    Family Hx:  Family History   Problem Relation Age  "of Onset     DIABETES Mother      Hypertension Mother      Arrhythmia Mother      Bladder Cancer Mother      Prostate Cancer Father      Hypertension Father      DIABETES Father      Hypertension Brother      Arrhythmia Other        Personal Hx:   Social History     Social History     Marital status:      Spouse name: N/A     Number of children: N/A     Years of education: N/A     Occupational History     Not on file.     Social History Main Topics     Smoking status: Never Smoker     Smokeless tobacco: Never Used     Alcohol use No     Drug use: Not on file     Sexual activity: No     Other Topics Concern     Not on file     Social History Narrative       Allergies:  No Known Allergies    Medications:  Prior to Admission medications    Not on File       Vitals:  /73  Pulse 96  Temp 98.4  F (36.9  C) (Oral)  Resp 18  Ht 1.72 m (5' 7.72\")  Wt 81.3 kg (179 lb 3.2 oz)  SpO2 98%  BMI 27.48 kg/m2    Exam:  GENERAL APPEARANCE: alert and no distress  HENT: mouth without ulcers or lesions  LYMPHATICS: no cervical or supraclavicular nodes  RESP: lungs clear to auscultation - no rales, rhonchi or wheezes  CV: regular rhythm, normal rate, no rub, no murmur  EDEMA: no LE edema bilaterally  ABDOMEN: soft, nondistended, nontender, bowel sounds normal  MS: extremities normal - no gross deformities noted, no evidence of inflammation in joints, no muscle tenderness  SKIN: no rash    Results:   Recent Results (from the past 336 hour(s))   HLA-AB Typing pcr/ssop    Collection Time: 07/31/17  6:31 AM   Result Value Ref Range    ABTest Method SSOP     A* locus A*01     A* A*33     B* locus B*14 (65)     B* B*58     C* locus C*07     C* C*08     Bw-1 Bw*6     Bw-2 Bw*4    HLA-DR/DQ Typing pcr/ssop    Collection Time: 07/31/17  6:31 AM   Result Value Ref Range    Drsso Test Method SSOP     DRB1* locus DRB1*01     DRB1* DRB1*13     DRB3* locus DRB3*03     DQB1* locus DQB1*05     DQB1* DQB1*06     DQA1*locus DQA1*01     " DPB1* DPB1*04:02     DPB1*locus DPB1*11:01     DPA1* DPA1*01:03     DPA1*locus DPA1*02:01    Lipid Profile [LAB18]    Collection Time: 07/31/17  7:06 AM   Result Value Ref Range    Cholesterol 102 <200 mg/dL    Triglycerides 76 <150 mg/dL    HDL Cholesterol 43 >39 mg/dL    LDL Cholesterol Calculated 43 <100 mg/dL    Non HDL Cholesterol 59 <130 mg/dL   Comprehensive metabolic panel [LAB17]    Collection Time: 07/31/17  7:06 AM   Result Value Ref Range    Sodium 134 133 - 144 mmol/L    Potassium 4.2 3.4 - 5.3 mmol/L    Chloride 94 94 - 109 mmol/L    Carbon Dioxide 31 20 - 32 mmol/L    Anion Gap 9 3 - 14 mmol/L    Glucose 106 (H) 70 - 99 mg/dL    Urea Nitrogen 48 (H) 7 - 30 mg/dL    Creatinine 8.02 (H) 0.66 - 1.25 mg/dL    GFR Estimate 7 (L) >60 mL/min/1.7m2    GFR Estimate If Black 8 (L) >60 mL/min/1.7m2    Calcium 8.9 8.5 - 10.1 mg/dL    Bilirubin Total 0.6 0.2 - 1.3 mg/dL    Albumin 3.9 3.4 - 5.0 g/dL    Protein Total 6.8 6.8 - 8.8 g/dL    Alkaline Phosphatase 105 40 - 150 U/L    ALT 24 0 - 70 U/L    AST 16 0 - 45 U/L   Phosphorus    Collection Time: 07/31/17  7:06 AM   Result Value Ref Range    Phosphorus 3.6 2.5 - 4.5 mg/dL   Cardiolipin Liza IgG and IgM [SYU5210]    Collection Time: 07/31/17  7:06 AM   Result Value Ref Range    Cardiolipin Antibody IgG <1.6  Negative   0.0 - 19.9 GPL-U/mL    Cardiolipin Antibody IgM 0.4 0.0 - 19.9 MPL-U/mL   Prostate spec antigen screen [[id number]]    Collection Time: 07/31/17  7:06 AM   Result Value Ref Range    PSA 0.68 0 - 4 ug/L   M Tuberculosis by Quantiferon [[id number]]    Collection Time: 07/31/17  7:06 AM   Result Value Ref Range    M Tuberculosis Result Negative NEG    M Tuberculosis Antigen Value 0.00 IU/mL   INR [TXQ5919]    Collection Time: 07/31/17  7:06 AM   Result Value Ref Range    INR 1.10 0.86 - 1.14   Partial thromboplastin time [LAB56]    Collection Time: 07/31/17  7:06 AM   Result Value Ref Range    PTT 30 22 - 37 sec   Thrombin time [MZS862]    Collection Time:  07/31/17  7:06 AM   Result Value Ref Range    Thrombin Time 17.5 13.0 - 19.0 sec   Lupus Anticoagulant Panel ([id number])    Collection Time: 07/31/17  7:06 AM   Result Value Ref Range    Lupus Result  NEG     Negative  (Note)  COMMENTS:  The INR is normal.  APTT ratio is normal.  DRVVT Screen ratio is normal.  Thrombin time is normal.  NEGATIVE TEST; A LUPUS ANTICOAGULANT WAS NOT DETECTED IN THIS  SPECIMEN WITHIN THE LIMITS OF THE TESTING REPERTOIRE.  If the clinical picture is strongly suggestive of an antiphospholipid  syndrome, recommend anticardiolipin and beta-2-glycoprotein (IgG and  IgM) antibody tests.  Yesica Mondragon M.D.  [phone]  8/1/2017    APTT:       Ratio  Patient  =  1.14  1:2 Mix  =  N/A  Reference:  Negative: Less than or equal to 1.16  Positive: Greater than or equal to 1.17     DILUTE VON VIPER VENOM TEST:  Screen Ratio = 1.07   Normal is less than 1.21       CBC with platelets differential [SDR268]    Collection Time: 07/31/17  7:06 AM   Result Value Ref Range    WBC 4.2 4.0 - 11.0 10e9/L    RBC Count 2.87 (L) 4.4 - 5.9 10e12/L    Hemoglobin 9.0 (L) 13.3 - 17.7 g/dL    Hematocrit 28.2 (L) 40.0 - 53.0 %    MCV 98 78 - 100 fl    MCH 31.4 26.5 - 33.0 pg    MCHC 31.9 31.5 - 36.5 g/dL    RDW 13.2 10.0 - 15.0 %    Platelet Count 119 (L) 150 - 450 10e9/L    Diff Method Automated Method     % Neutrophils 59.4 %    % Lymphocytes 27.6 %    % Monocytes 11.3 %    % Eosinophils 1.0 %    % Basophils 0.5 %    % Immature Granulocytes 0.2 %    Nucleated RBCs 0 0 /100    Absolute Neutrophil 2.5 1.6 - 8.3 10e9/L    Absolute Lymphocytes 1.2 0.8 - 5.3 10e9/L    Absolute Monocytes 0.5 0.0 - 1.3 10e9/L    Absolute Eosinophils 0.0 0.0 - 0.7 10e9/L    Absolute Basophils 0.0 0.0 - 0.2 10e9/L    Abs Immature Granulocytes 0.0 0 - 0.4 10e9/L    Absolute Nucleated RBC 0.0    HLA Typing Complete SOT Recipient    Collection Time: 07/31/17  7:06 AM   Result Value Ref Range    HLA Typing Complete SOT Recipient        Specimen received - Immunology report to follow upon completion.   PRA Single Antigen IgG Antibody    Collection Time: 07/31/17  7:06 AM   Result Value Ref Range    PRA Single Antigen IgG Antibody       Specimen received - Immunology report to follow upon completion.   BK virus PCR quantitative [[id number]]    Collection Time: 07/31/17  7:06 AM   Result Value Ref Range    BK Virus Specimen Plasma     BK Virus Result BK Virus DNA Not Detected BKNEG copies/mL    BK Virus Log  <2.7 Log copies/mL     Not Calculated   The Real-Time quantitative BK Virus assay was developed and its performance   characteristics determined by the [facility] at   the [facility] in [place], [place]. The   primers and probes for each analyte are Analyte Specific Reagents (ASRs)   manufactured by [employer].   ASRs are used in many laboratory tests necessary for standard medical care and   generally do not require U.S. Food and Drug Administration approval. The FDA   has determined that such clearance or approval is not necessary.   This test is used for clinical purposes. It should not be regarded as   investigational or for research. This laboratory is certified under the   Clinical Laboratory Improvement Amendments of 1988 (CLIA-88) as qualified to   perform high complexity clinical laboratory testing.     CMV Antibody IgG [CZD1653]    Collection Time: 07/31/17  7:06 AM   Result Value Ref Range    CMV Antibody IgG (H) 0.0 - 0.8 AI     >8.0  Positive   Antibody index (AI) values reflect qualitative changes in antibody   concentration that cannot be directly associated with clinical condition or   disease state.     EBV Capsid Antibody IgG [[id number]]    Collection Time: 07/31/17  7:06 AM   Result Value Ref Range    EBV Capsid Antibody IgG  0.0 - 0.8 AI     <0.2  No detectable antibody.   Antibody index (AI) values reflect qualitative changes in antibody   concentration that cannot be  directly associated with clinical condition or   disease state.     Hepatitis B core antibody [[id number]]    Collection Time: 07/31/17  7:06 AM   Result Value Ref Range    Hepatitis B Core Liza Nonreactive NR   Hepatitis B Surface Antibody [[id number]]    Collection Time: 07/31/17  7:06 AM   Result Value Ref Range    Hepatitis B Surface Antibody 15.97 (H) <8.00 m[IU]/mL   Hepatitis B surface antigen [ITW435]    Collection Time: 07/31/17  7:06 AM   Result Value Ref Range    Hep B Surface Agn Nonreactive NR   Hepatitis C antibody [UAQ755]    Collection Time: 07/31/17  7:06 AM   Result Value Ref Range    Hepatitis C Antibody  NR     Nonreactive   Assay performance characteristics have not been established for newborns,   infants, and children     HIV Antigen Antibody Combo Pretransplant    Collection Time: 07/31/17  7:06 AM   Result Value Ref Range    HIV Antigen Antibody Combo Pretransplant  NR     Nonreactive   HIV-1 p24 Ag & HIV-1/HIV-2 Ab Not Detected     Anti Treponema [BRE1394]    Collection Time: 07/31/17  7:06 AM   Result Value Ref Range    Treponema pallidum Antibody Negative NEG   Varicella Zoster Virus Antibody IgG [[id number]]    Collection Time: 07/31/17  7:06 AM   Result Value Ref Range    Varicella Zoster Virus Antibody IgG 6.1 (H) 0.0 - 0.8 AI   Factor 2 and 5 mutation analysis    Collection Time: 07/31/17  7:06 AM   Result Value Ref Range    Copath Report       Patient Name: MIKE ELIZALDE  MR#: [id number]  Specimen #: [id number]  Collected: 7/31/2017 07:06  Received: 7/31/2017 11:21  Reported: 8/1/2017 15:44  Ordering Phy(s): KAREN FRITZ    For improved result formatting, select 'View Enhanced Report Format'  under Linked Documents section.  _________________________________________    TEST(S) REQUESTED:  Factor 5 Leiden and Factor 2 by PCR    SPECIMEN DESCRIPTION:  Blood    METHODOLOGY:   The regions of genomic DNA containing the U5415I Factor 5  gene mutation (Factor V Leiden) and the Factor  2(Prothrombin V94290P)  gene mutation were simultaneously amplified using the polymerase chain  reaction.  The amplified products were digested with restriction  endonuclease TaqI and products were analyzed by gel electrophoresis.    RESULTS:    Factor V 1691G>A (Leiden)  RESULTS:  Mutation analyzed:     1691G>A  Factor V 1691G>A (Leiden)  Interpretation:      ABSENT  Factor V 1691G>A (Leiden) mutation  genotype:      G/G    FACTOR 2/PROTHROMBIN  RESULTS:  Mutation analyzed:     73358U>A  Factor 2 Mutation Interpretation:      ABSENT  Factor 2 Mutation genotype:      G/G    INTERPRETATION:  The patient is negative for the Factor V 1691G>A (Leiden) and negative  for the Factor 2 mutation.    COMMENTS:  If a patient is the recipient of an allogeneic bone marrow transplant,  this test must be done on a pre-transplant sample or buccal swab.  A  previous allogeneic bone marrow transplant will interfere with test  results.  Call the [facility]([phone]) for  instructions on sample collection for these patients.    This test was developed and its performance characteristics determined  by the [facility],  [facility]. It has not been cleared or approved by the FDA. The  laboratory is regulated under CLIA as qualified to perform  high-complexity testing. This test is used for clinical purposes. It  should not be regarded as investigational or for research.    A reside nt/fellow in an accredited training program was involved in the  selection of testing, review of laboratory data, and/or interpretation  of this case.  I, as the senior physician, attest that I: (i) confirmed  appropriate testing, (ii) examined the relevant raw data for the  specimen(s); and (iii) rendered or confirmed the interpretation(s).    Electronically Signed Out By:  GALE Huang    CPT Codes:  A: 43960-K4WQDJ, 51296-N5TQDT, -TOJGRI(2)    TESTING LAB  LOCATION:  [facility]  [address], [facility] [address] [place] [address] [phone]    COLLECTION SITE:  Client:  [facility]  Location:  [facility] (B)     Protein electrophoresis    Collection Time: 07/31/17  7:06 AM   Result Value Ref Range    Albumin Fraction 4.3 3.7 - 5.1 g/dL    Alpha 1 Fraction 0.3 0.2 - 0.4 g/dL    Alpha 2 Fraction 0.6 0.5 - 0.9 g/dL    Beta Fraction 0.6 0.6 - 1.0 g/dL    Gamma Fraction 0.9 0.7 - 1.6 g/dL    Monoclonal Peak 0.0 0.0 g/dL    ELP Interpretation:       Essentially normal electrophoretic pattern, no monoclonal protein seen.   Pathological significance requires clinical correlation.   NANCY Saleem M.D., Ph.D   Pathologist ([phone])     [place] and lambda light chain    Collection Time: 07/31/17  7:06 AM   Result Value Ref Range    Kappa Free Lt Chain 9.02 (H) 0.33 - 1.94 mg/dL    Lambda Free Lt Chain 10.60 (H) 0.57 - 2.63 mg/dL    Kappa Lambda Ratio 0.49 0.26 - 1.65   Protein Immunofixation Serum    Collection Time: 07/31/17  7:06 AM   Result Value Ref Range    Immunofixation ELP       No monoclonal protein seen on immunofixation.  Pathological significance   requires clinical correlation.   NANCY Saleem M.D., Ph.D   Pathologist ([phone])      IGG 1110 695 - 1620 mg/dL    IGA 66 (L) 70 - 380 mg/dL    IGM 38 (L) 60 - 265 mg/dL   ABO/Rh type and screen [KIS694]    Collection Time: 07/31/17  7:07 AM   Result Value Ref Range    ABO B     RH(D)  Pos     Antibody Screen Neg     Test Valid Only At       [facility]    Specimen Expires 08/03/2017    Antibody titer red cell [[id number]]    Collection Time: 07/31/17  7:07 AM   Result Value Ref Range    Antibody Titer Anti A IgM 16, IgG 64    ABO Subtyping [[id number]]    Collection Time: 07/31/17  7:07 AM   Result Value Ref Range    Antigen Type Canceled, Test credited     Blood Bank Comment       Patient  not ABO type A or AB. A subtype not applicable. 07/31/17 1440 MT   Routine UA with microscopic [[id number]]    Collection Time: 07/31/17  7:18 AM   Result Value Ref Range    Color Urine Misty     Appearance Urine Cloudy     Glucose Urine Negative NEG mg/dL    Bilirubin Urine Negative NEG    Ketones Urine 5 (A) NEG mg/dL    Specific Gravity Urine 1.017 1.003 - 1.035    Blood Urine Small (A) NEG    pH Urine 5.0 5.0 - 7.0 pH    Protein Albumin Urine >499 (A) NEG mg/dL    Urobilinogen mg/dL 0.0 0.0 - 2.0 mg/dL    Nitrite Urine Negative NEG    Leukocyte Esterase Urine Large (A) NEG    Source Midstream Urine     WBC Urine 151 (H) 0 - 2 /HPF    RBC Urine 18 (H) 0 - 2 /HPF    WBC Clumps Present (A) NEG /HPF    Bacteria Urine Few (A) NEG /HPF    Squamous Epithelial /HPF Urine 2 (H) 0 - 1 /HPF    Mucous Urine Present (A) NEG /LPF    Hyaline Casts 2 0 - 2 /LPF    Amorphous Crystals Few (A) NEG /HPF   Urine Culture Aerobic Bacterial    Collection Time: 07/31/17  7:18 AM   Result Value Ref Range    Specimen Description Unspecified Urine     Culture Micro       <10,000 colonies/mL urogenital guanakito  Susceptibility testing not routinely done      Micro Report Status FINAL 08/01/2017    EKG 12-lead, tracing only [EKG1]    Collection Time: 07/31/17  1:35 PM   Result Value Ref Range    Interpretation ECG Click View Image link to view waveform and result    ABO type [[id number]]    Collection Time: 07/31/17  1:48 PM   Result Value Ref Range    ABO B     RH(D)  Pos     Specimen Expires 08/03/2017    Protein electrophoresis random urine    Collection Time: 07/31/17  1:55 PM   Result Value Ref Range    Albumin Fraction Urine 67.2 (H) 0 %    Alpha 1 Fraction Urine 12.9 (H) 0 %    Alpha 2 Fraction Urine 2.3 (H) 0 %    Beta Fraction Urine 2.1 (H) 0 %    Gamma Fraction Urine 15.5 (H) 0 %    Monoclonal Peak Urine 2.3 (H) 0% %    ELP Interpretation Urine       Albumin and globulins seen. A very small monoclonal protein (2.3 %) is seen in   the  gamma  fraction.  See immunofixation report on this sample. Pathological   significance requires clinical correlation. NANCY Saleem M.D., Ph.D.,   Pathologist ()     Protein immunofixation urine    Collection Time: 07/31/17  1:55 PM   Result Value Ref Range    Immunofix ELP Urine       (Note)  Monoclonal free immunoglobulin light chain of kappa chain type.  Possible very small monoclonal free immunoglobulin light chain of  lambda chain type. Pathological significance requires clinical  correlation.  NANCY Saleem M.D., Ph.D., Pathologist ([phone]).       US Renal Complete    Collection Time: 08/01/17  8:22 AM   Result Value Ref Range    Radiologist flags Renal ultrasound        Patient was seen by myself, Dr. Rowdy Suarez, in conjunction with Cyndee Matt PA-C as part of a shared visit.    I personally reviewed past medical and surgical history, vital signs, medications and labs.  Present and past medical history, along with significant physical exam findings were all reviewed with VIDHI.    My pop findings:  Bro Barnes is 62 year old, who presents for Kidney transplant evaluation.    Key management decisions made by me and discussed with VIDHI:  ESRD secondary to unknown etiology vs CNI   Chronic immunosuppression   Heart transplant   Pyuria with negative culture. since Q-gold is negative, this can be attributed to minimal UOP.     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   - Currently on immunosuppression for his heart transplant. At this point with the severe TR, I would like a formal assessment of his PAP. If PAP> 60 will need to discuss further options.  - Complex renal cyst with variable progression. Would like formal urology input giving his on going immunosuppression.

## 2017-07-31 NOTE — MR AVS SNAPSHOT
After Visit Summary   7/31/2017    Bro Barnes    MRN: [id number]           Patient Information     Date Of Birth          1955        Visit Information        Provider Department      7/31/2017 8:00 AM UC TRANSPLANT CLASS [facility] Solid Organ Transplant        Today's Diagnoses     Awaiting organ transplant    -  1       Follow-ups after your visit        Who to contact     If you have questions or need follow up information about today's clinic visit or your schedule please contact [facility] SOLID ORGAN TRANSPLANT directly at [phone].  Normal or non-critical lab and imaging results will be communicated to you by [facility], letter or phone within 4 business days after the clinic has received the results. If you do not hear from us within 7 days, please contact the clinic through Liibookhart or phone. If you have a critical or abnormal lab result, we will notify you by phone as soon as possible.  Submit refill requests through [facility] or call your pharmacy and they will forward the refill request to us. Please allow 3 business days for your refill to be completed.          Additional Information About Your Visit        MyChart Information     [facility] gives you secure access to your electronic health record. If you see a primary care provider, you can also send messages to your care team and make appointments. If you have questions, please call your primary care clinic.  If you do not have a primary care provider, please call [phone] and they will assist you.        Care EveryWhere ID     This is your Care EveryWhere ID. This could be used by other organizations to access your [place] medical records  [id number]         Blood Pressure from Last 3 Encounters:   No data found for BP    Weight from Last 3 Encounters:   No data found for Wt              Today, you had the following     No orders found for display       Primary Care Provider Office Phone # Fax #    Eduardo Coleman MD  [phone]       [facility] [address]  [address]  [place]        Equal Access to Services     RITIKA MADDOX : Hadii aad ku hadmariliamona Destinyali, nainda meydewayneha, carolta kamarylin washburn, diogo deionin hayaanoah mistrybjorn graham laBeckkevin galloway. So [facility] [phone].    ATENCIÓN: Si habla español, tiene a ortiz disposición servicios gratuitos de asistencia lingüística. Llame al [phone].    We comply with applicable federal civil rights laws and [place] laws. We do not discriminate on the basis of race, color, national origin, age, disability sex, sexual orientation or gender identity.            Thank you!     Thank you for choosing [facility] SOLID ORGAN TRANSPLANT  for your care. Our goal is always to provide you with excellent care. Hearing back from our patients is one way we can continue to improve our services. Please take a few minutes to complete the written survey that you may receive in the mail after your visit with us. Thank you!             Your Updated Medication List - Protect others around you: Learn how to safely use, store and throw away your medicines at www.disposemymeds.org.          This list is accurate as of: 7/31/17 11:59 PM.  Always use your most recent med list.                   Brand Name Dispense Instructions for use Diagnosis    ASPIRIN PO      Take 81 mg by mouth daily        ATORVASTATIN CALCIUM PO      Take 10 mg by mouth daily        candesartan 4 MG Tabs tablet    ATACAND     Take 2 mg by mouth daily        LASIX PO      Take 40 mg by mouth        LEVOTHYROXINE SODIUM PO      Take 25 mcg by mouth daily        mycophenolate tablet      Take 750 mg by mouth        OMEPRAZOLE PO      Take 10 mg by mouth daily        tacrolimus capsule      Take 1 mg by mouth 2 times daily        VITAMIN D (CHOLECALCIFEROL) PO      Take 2,000 Units by mouth daily

## 2017-08-01 ENCOUNTER — TELEPHONE (OUTPATIENT)
Dept: TRANSPLANT | Facility: CLINIC | Age: 62
End: 2017-08-01

## 2017-08-01 LAB
ALBUMIN MFR UR ELPH: 67.2 %
ALBUMIN SERPL ELPH-MCNC: 4.3 G/DL (ref 3.7–5.1)
ALPHA1 GLOB MFR UR ELPH: 12.9 %
ALPHA1 GLOB SERPL ELPH-MCNC: 0.3 G/DL (ref 0.2–0.4)
ALPHA2 GLOB MFR UR ELPH: 2.3 %
ALPHA2 GLOB SERPL ELPH-MCNC: 0.6 G/DL (ref 0.5–0.9)
B-GLOBULIN MFR UR ELPH: 2.1 %
B-GLOBULIN SERPL ELPH-MCNC: 0.6 G/DL (ref 0.6–1)
BACTERIA SPEC CULT: NORMAL
COPATH REPORT: NORMAL
GAMMA GLOB MFR UR ELPH: 15.5 %
GAMMA GLOB SERPL ELPH-MCNC: 0.9 G/DL (ref 0.7–1.6)
IMMUNOFIX ELP, URINE: NORMAL
INTERPRETATION ECG - MUSE: NORMAL
LA PPP-IMP: NORMAL
M PROTEIN MFR UR ELPH: 2.3 %
M PROTEIN SERPL ELPH-MCNC: 0 G/DL
M TB TUBERC IFN-G BLD QL: NEGATIVE
M TB TUBERC IFN-G/MITOGEN IGNF BLD: 0 IU/ML
MICRO REPORT STATUS: NORMAL
PROT PATTERN SERPL ELPH-IMP: NORMAL
PROT PATTERN UR ELPH-IMP: ABNORMAL
SPECIMEN SOURCE: NORMAL

## 2017-08-01 NOTE — TELEPHONE ENCOUNTER
Several phone calls back and forth this morning concerning his records. Dr Suarez had wanted pathology if there was any testing for amyloidosis and if Bro had a fat pad biopsy. Spoke to Bro and he did not have a fat pad biopsy but said testing for amyloidosis was negative. Bro contacted his colleague/nephrologist which in turn contacted the pathologist that did the diagnosis on his renal biopsy. The pathologist graciously emailed me the kidney biopsy report which I forwarded to Dr Suarez and sent to karla.

## 2017-08-02 LAB
BKV DNA # SPEC NAA+PROBE: NORMAL COPIES/ML
BKV DNA SPEC NAA+PROBE-LOG#: NORMAL LOG COPIES/ML
KAPPA LC UR-MCNC: 9.02 MG/DL (ref 0.33–1.94)
KAPPA LC/LAMBDA SER: 0.49 {RATIO} (ref 0.26–1.65)
LAMBDA LC SERPL-MCNC: 10.6 MG/DL (ref 0.57–2.63)
SPECIMEN SOURCE: NORMAL

## 2017-08-03 LAB
A* LOCUS: NORMAL
A*: NORMAL
ABTEST METHOD: NORMAL
B* LOCUS: NORMAL
B*: NORMAL
BW-1: NORMAL
BW-2: NORMAL
C* LOCUS: NORMAL
C*: NORMAL
DPA1*: NORMAL
DPA1*LOCUS: NORMAL
DPB1*: NORMAL
DPB1*LOCUS: NORMAL
DQA1*LOCUS: NORMAL
DQB1* LOCUS: NORMAL
DQB1*: NORMAL
DRB1* LOCUS: NORMAL
DRB1*: NORMAL
DRB3* LOCUS: NORMAL
DRSSO TEST METHOD: NORMAL
IGA SERPL-MCNC: 66 MG/DL (ref 70–380)
IGG SERPL-MCNC: 1110 MG/DL (ref 695–1620)
IGM SERPL-MCNC: 38 MG/DL (ref 60–265)
PROT PATTERN SERPL IFE-IMP: ABNORMAL

## 2017-08-09 ENCOUNTER — COMMITTEE REVIEW (OUTPATIENT)
Dept: TRANSPLANT | Facility: CLINIC | Age: 62
End: 2017-08-09

## 2017-08-09 ENCOUNTER — TELEPHONE (OUTPATIENT)
Dept: TRANSPLANT | Facility: CLINIC | Age: 62
End: 2017-08-09

## 2017-08-09 NOTE — LETTER
2017    Bro Barnes  [address]  [address]   1955    Dear Bro,    It was a pleasure to see you here recently for consideration of a kidney transplant. Your pre-transplant evaluation began on 2017. Your evaluation results were reviewed at our Multidisciplinary Selection Committee on 2017. The Committee has approved you as a candidate for kidney transplant pending the successful completion of the following items:    1. Please call me when your colonoscopy is complete and I will request those records.  2.  As we discussed on the phone, Dr Suarez would like you to see your Urologist for clearance in regards to the left renal cyst. Dr Suarez would like renal cell carcinoma ruled out. Please call me when complete so I can request those records.  3. I am requesting the records from your Hematolgist/Oncologist for clearance regarding your gammopathy. Once I have those records, I will review with Dr Suarez and let you know if there is anything further that needs to be done.  4. As we discussed on the phone, Dr Suarez will be in contact with your cardiologist to discuss the concerns about your tricuspid insufficieny. If there is any further testing to be done I will contact you.    Once all of the above is complete you will be added to the  donor [facility] wait list as active status for kidney transplant. Your waiting time will coincide with the start date of your dialysis. There will be a separate listing letter sent when you are placed on the waiting list. In the meantime, any potential living donors may register online to begin their evaluations. The web site is [email or web address] or they may call the donor line at [phone]. Once you have an approved living donor then I will be able to enter you into the Paired Exchange Program.    If you have any questions please call me directly at [phone]. It has been a pleasure to work with you.      Sincerely,      Loretta Fritz, RN BSN Transplant Coordinator    CC:Eduardo Coleman, Jimmie Mills, Micky Montesinos, Sue Reilly. John Millard

## 2017-08-09 NOTE — COMMITTEE REVIEW
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)  Secondary Diagnosis:     Committee Review Members:  Nutrition Viola Mcnally, RD   Pharmacy Denia Ferrer [facility]    - Clinical Jaclyn Bev Jorgensen, [facility]   Transplant Cyndee Matt PA-C, Vignesh Adler MD, Milan Landin MD, Evy Penaloza, RN, Erin Helm LPN, Ludivina Murphy, RN, Sunshine Simeon RN       Transplant Eligibility: Irreversible chronic kidney disease treated w/dialysis or expected need for dialysis    Committee Review Decision: Approved    Relative Contraindications: None    Absolute Contraindications: None    Committee Chair Vignesh Adler MD verbally attested to the committee's decision.    Committee Discussion Details: Medical records and evaluation results reviewed to date. Committee approves patient as a kidney transplant candidate. He is on dialysis so will not be listed until evaluation completed.Committee would like records from a Heme/Onc consult that was done in 2013 regarding abnormal paraproteinemia and also a follow up clearance from Hematology/Oncology. Concern for his known tricuspid regurgitation was discussed and Dr Suarez is to discuss with Bro's cardiologist in [place].We are also asking him to see Urology to repeat his renal ultrasound and compare to previous. We will also have radiology here compare the renal ultrasound done during his evaluation to the last one done in [place] that is now in our PACS system. Finally, he needs to have a colonoscopy.Patient will be called and transplant summary letter will be sent.

## 2017-08-09 NOTE — TELEPHONE ENCOUNTER
Spoke to Bro and informed him he is accepted as a kidney transplant candidate and will be listed once he completes his evaluation. He is on dialysis and is aware his wait time goes is retroactive to the start date of dialysis so he is not disadvantaged. Committee is asking for records from his heme/onc appointments from 2013 for abnormal paraproteinemia. Bro states he has followed with Dr Stock at [facility] and was discharged from heme/onc last year. Will request those records. If the records do not state he is cleared or discharged Bro knows he will need to see her again for formal clearance.Committee discussed his tricuspid insufficiency and Dr Suarez will speak to Bro's cardiologist directly. There was discussion regarding a left renal cyst and team is asking for a repeat renal ultrasound in [place] and have radiology compare. He may need to see Urology depending on the comparison and Bro is aware of this. He also will complete his colonoscopy. Transplant summary letter generated and routed to administrative staff for mailing.

## 2017-08-10 ENCOUNTER — TELEPHONE (OUTPATIENT)
Dept: TRANSPLANT | Facility: CLINIC | Age: 62
End: 2017-08-10

## 2017-08-10 NOTE — TELEPHONE ENCOUNTER
Spoke to Bro and clarified the need for a formal Urology Consult for clearance to rule out RCC. He does have a left renal cyst. He will contact his Urologist and schedule an appointment.

## 2017-08-11 ENCOUNTER — TRANSFERRED RECORDS (OUTPATIENT)
Dept: HEALTH INFORMATION MANAGEMENT | Facility: CLINIC | Age: 62
End: 2017-08-11

## 2017-08-14 ENCOUNTER — MEDICAL CORRESPONDENCE (OUTPATIENT)
Dept: TRANSPLANT | Facility: CLINIC | Age: 62
End: 2017-08-14

## 2017-08-16 ENCOUNTER — TRANSFERRED RECORDS (OUTPATIENT)
Dept: HEALTH INFORMATION MANAGEMENT | Facility: CLINIC | Age: 62
End: 2017-08-16

## 2017-08-18 LAB
SA1 CELL: NORMAL
SA1 COMMENTS: NORMAL
SA1 HI RISK ABY: NORMAL
SA1 MOD RISK ABY: NORMAL
SA1 TEST METHOD: NORMAL
SA2 CELL: NORMAL
SA2 COMMENTS: NORMAL
SA2 HI RISK ABY UA: NORMAL
SA2 MOD RISK ABY: NORMAL
SA2 TEST METHOD: NORMAL

## 2017-08-21 ENCOUNTER — MEDICAL CORRESPONDENCE (OUTPATIENT)
Dept: TRANSPLANT | Facility: CLINIC | Age: 62
End: 2017-08-21

## 2017-08-21 ENCOUNTER — TRANSFERRED RECORDS (OUTPATIENT)
Dept: HEALTH INFORMATION MANAGEMENT | Facility: CLINIC | Age: 62
End: 2017-08-21

## 2017-08-21 ENCOUNTER — DOCUMENTATION ONLY (OUTPATIENT)
Dept: TRANSPLANT | Facility: CLINIC | Age: 62
End: 2017-08-21

## 2017-08-24 ENCOUNTER — TRANSFERRED RECORDS (OUTPATIENT)
Dept: HEALTH INFORMATION MANAGEMENT | Facility: CLINIC | Age: 62
End: 2017-08-24

## 2017-09-06 ENCOUNTER — COMMITTEE REVIEW (OUTPATIENT)
Dept: TRANSPLANT | Facility: CLINIC | Age: 62
End: 2017-09-06

## 2017-09-06 NOTE — LETTER
PHYSICIAN ORDER   ALA/PRA BLOOD    DATE & TIME ISSUED: 20174:18 PM  PATIENT NAME: Bro Barnes   : 1955     [facility] MR# [if applicable]: [id number]     DIAGNOSIS/ICD-10 CODE: Awaiting Organ transplant [Z76.82}  EXPIRES: (1 YEAR AFTER DATE ISSUED)  EVERY 12 weeks / 12 months  Please start this lab draw on 2017  1. Please draw 20ml of blood in red top (plain) tube for Antileukocyte Antibody (ALA or PRA).   2. Label tubes with the patient s name, complete lab slip.        3. Mailers, lab slips with instructions are sent to patient separately.     4. Call the Outreach Lab at [phone] to reorder mailers.      5. Mail blood to (this address is also on the mailers):    IMMUNOLOGY LABORATORY  [facility]  Room 7[address]  [address]  [address]            Adam Avila MD  Department of Surgery

## 2017-09-06 NOTE — LETTER
2017    Bro Barnes  [address] [facility] [address]  [address]      Dear Mr. Barnes,    This letter is sent to confirm that you have completed your transplant work-up and you are a candidate in the {Organ } transplant program at the [facility].  You were placed on the {Organ } active waitlist on ***.      When you are active on the waitlist and an organ becomes available, a coordinator will need to speak to you immediately.  You could be contacted at any time during the day or night as an organ could become available at any time.  Please make certain our office always has your current telephone numbers and address.      Items we will need from you:      We have received approval from your insurance company for the transplant procedure.  It is critical that you notify us if there is any change in your insurance.  It is also important that you familiarize yourself with the details of your specific insurance policy.  Our patient  is available to assist you if you should have any questions regarding your coverage.      An ALA or PRA blood sample may need to be sent here every 3 to 6 months to match you with  donors or any potential living donors.  If you need this testing, special mailing boxes (called mailers) will be sent to you directly from the Outreach Department.  You should take the physician order form and the  to your home laboratory when it is time to for this testing to be done.  Additional mailers can be obtained by calling the Transplant Office and asking to speak to a {Organ } .      During this waiting period, we may request additional periodic laboratory tests with your primary physician.  It will be your responsibility to remind your physician to forward your results to the Transplant Office.      We need to be kept informed of any changes in your medical condition such  as:    o changes in your medications,   o significant changes in your health  o significant infections (such as pneumonia or abscesses)  o blood transfusions  o any condition which requires hospitalization  o any surgery      Remember to complete any routine cancer screening tests required before your transplant.  This includes colonoscopy; prostate screening for men, and mammogram and gynecologic testing for women, as well as dental work.  Your primary care clinic can assist you with arranging for these exams.  Remind your caregivers to forward copies of the records and final reports.    We want you to know that our program has physician and surgeon coverage 24 hours a day, 365 days a year. If this coverage changes or there are substantial program changes, you will be notified in writing by letter.     Attached is a letter from the [employer] for Organ Sharing (UNOS). It describes the services and information offered to patients by UNOS and the Organ Procurement and Transplantation Network.    We appreciate having had the opportunity to participate in your care.  If you have questions, please feel free to call the Transplant Office at [phone] or [phone].      Sincerely,       {Organ UC:[id number]} Transplant Program    Enclosures: {SOT enclosures:493126}  CC:   ***

## 2017-09-06 NOTE — COMMITTEE REVIEW
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)  Secondary Diagnosis:     Committee Review Members:  Nephrology Milan Landin MD, Dharmesh Ford, APRN CNP, Rowdy Suarez MD   Nurse Tami Umaña, JOSIE   Nutrition Viola Mcnally,    Pharmacy Denia Ferrer, [facility]    - Clinical Susanna Meyers, [facility]   Transplant Cyndee Matt PA-C, Gisela Pretty, Vignesh Adler MD, Erin Helm LPN, Hannah Car, BETTY, Marleen Marie RN, Ludivina Murphy, JOSIE, Sunshine Álvarez RN, Sunshine Simeon RN, Delgado Doran MD, Charmaine Fritz RN, Adam Avila MD       Transplant Eligibility: Irreversible chronic kidney disease treated w/dialysis or expected need for dialysis    Committee Review Decision: Approved    Relative Contraindications: None    Absolute Contraindications: None    Committee Chair Adam Avila MD verbally attested to the committee's decision.    Committee Discussion Details: Reviewed additional clearance from patient's oncologist,urologist and cardiologist. Committee approves ACTIVE status as soon as colonoscopy results are received. Patient will be called and listing letter will be sent.

## 2017-09-09 ENCOUNTER — TRANSFERRED RECORDS (OUTPATIENT)
Dept: HEALTH INFORMATION MANAGEMENT | Facility: CLINIC | Age: 62
End: 2017-09-09

## 2017-09-12 ENCOUNTER — TRANSFERRED RECORDS (OUTPATIENT)
Dept: HEALTH INFORMATION MANAGEMENT | Facility: CLINIC | Age: 62
End: 2017-09-12

## 2017-09-18 ENCOUNTER — DOCUMENTATION ONLY (OUTPATIENT)
Dept: TRANSPLANT | Facility: CLINIC | Age: 62
End: 2017-09-18

## 2017-09-18 ENCOUNTER — TELEPHONE (OUTPATIENT)
Dept: TRANSPLANT | Facility: CLINIC | Age: 62
End: 2017-09-18

## 2017-09-18 DIAGNOSIS — Z76.82 AWAITING ORGAN TRANSPLANT: Primary | ICD-10-CM

## 2017-09-18 NOTE — LETTER
PHYSICIAN ORDER   ALA/PRA BLOOD    DATE & TIME ISSUED: 20172:17 PM  PATIENT NAME: Bro Barnes   : 1955     [facility] MR# [if applicable]: [id number]     DIAGNOSIS/ICD-10 CODE: Awaiting Organ transplant [Z76.82}  EXPIRES: (1 YEAR AFTER DATE ISSUED)  EVERY 12 weeks / 3 months   Your next PRA is due 10/31/2017  1. Please draw 20ml of blood in red top (plain) tube for Antileukocyte Antibody (ALA or PRA).   2. Label tubes with the patient s name, complete lab slip.        3. Mailers, lab slips with instructions are sent to patient separately.     4. Call the Outreach Lab at [phone] to reorder mailers.      5. Mail blood to (this address is also on the mailers):    IMMUNOLOGY LABORATORY  [facility]  Room [phone] [address]  [address]  [address]            Adam Avila MD  Department of Surgery

## 2017-09-18 NOTE — LETTER
2017    Bro Barnes  [address]  [address]   1955    Dear Bro,    This letter is sent to confirm that you have completed your transplant work-up and you are a candidate in the kidney transplant program at the [facility].  You were placed on the kidney active waitlist on 2017.      When you are active on the waitlist and an organ becomes available, a coordinator will need to speak to you immediately.  You could be contacted at any time during the day or night as an organ could become available at any time.  Please make certain our office always has your current telephone numbers and address.  Please keep your phone on and charged at all times. You must answer or return all calls even if you do not recognize the incoming phone number.Remember, the on call coordinator will not leave a message and you have one hour to return the call before the organ is offered to the next recipient.     Items we will need from you:      We have received approval from your insurance company for the transplant procedure.  It is critical that you notify us if there is any change in your insurance.  It is also important that you familiarize yourself with the details of your specific insurance policy.  Our patient  is available to assist you if you should have any questions regarding your coverage.      An ALA or PRA blood sample will  need to be sent here every 3 months to match you with  donors or any potential living donors.  If you need this testing, special mailing boxes (called mailers) will be sent to you directly from the Outreach Department. You should take the physician order form and the  to your home laboratory when it is time to for this testing to be done.  Additional mailers can be obtained by calling the Transplant Office and asking to speak to a kidney . Your next PRA is due to be drawn  on 10/31/2017.      During this waiting period, we may request additional periodic laboratory tests with your primary physician.  It will be your responsibility to remind your physician to forward your results to the Transplant Office.      We need to be kept informed of any changes in your medical condition such as:    o changes in your medications,   o significant changes in your health  o significant infections (such as pneumonia or abscesses)  o blood transfusions  o any condition which requires hospitalization  o any surgery      Remember to complete any routine cancer screening tests required before your transplant.  This includes colonoscopy; prostate screening for men, and mammogram and gynecologic testing for women, as well as dental work.  Your primary care clinic can assist you with arranging for these exams.  Remind your caregivers to forward copies of the records and final reports. You are up to date with all health maintenance.    We want you to know that our program has physician and surgeon coverage 24 hours a day, 365 days a year. If this coverage changes or there are substantial program changes, you will be notified in writing by letter. You will now have a new wait list coordinator. Her name is Evy Penaloza and her phone number is [phone] and her assistant LPN is Erin Helm [phone].    Attached is a letter from the [employer] for Organ Sharing (UNOS). It describes the services and information offered to patients by UNOS and the Organ Procurement and Transplantation Network.    We appreciate having had the opportunity to participate in your care.  If you have questions, please feel free to call the Transplant Office at [phone] or [phone].  It has been a pleasure to work with you!    Sincerely,     Loretta Fritz, RN, BSN Transplant Coordinator  Solid Organ Transplant [address]  [address] [place] [address], [address] [address]  Phone [phone]  Fax  [phone]  [email or web address]      Kidney Transplant Program    Enclosures: ALA/JOSÉ MIGUEL Physician Order, Telephone Contact List, Travel Resources, UNOS Letter, Waitlist Information Update and While You Are Waiting  CC:   Eduardo Coleman, Jimmie Mills, Micky Montesinos, Sue Reilly, John Millard

## 2017-09-18 NOTE — TELEPHONE ENCOUNTER
Called Bro to let him know we now have all the clinical information and financial approval to place him on the ACTIVE  donor kidney wait list. We discussed that essentially that means he is on call  and may get an organ offer any time of the day or night. He was reminded that the on call coordinator will not leave a message so he must return all calls whether or not he recognizes the phone number. He has one hour to return the call before the organ is offered to the next recipient. He is aware he may decline an organ offer for any reason and there will not be a penalty or consequence. He will be told when to arrive and where to go, when to stop eating and drinking and what medications to take. He will check with his airlines for flight information as he will be coming from [place].Reviewed the overall admission routine and preparation for transplant. He will be sent the PRA orders and mailers. His next PRA is due 10/31/2017. His living donor has made contact with the donor coordinators. He is aware he may get an organ offer despite having a living donor in evaluation and he will have to decide which way to go. He was in between surgical cases when I called and he did not have any further questions. He is aware he will now have a wait list coordinator. Listing letter, PRA orders and mailers will be generated and routed to administrative staff for mailing.

## 2017-09-18 NOTE — PROGRESS NOTES
Patient was added to the UN  donor kidney wait list as ACTIVE status today 2017. He is on dialysis per [facility] records of 10/14/2016. Active listing letter, PRA orders and mailers will be sent.

## 2017-09-21 ENCOUNTER — MEDICAL CORRESPONDENCE (OUTPATIENT)
Dept: TRANSPLANT | Facility: CLINIC | Age: 62
End: 2017-09-21

## 2017-09-26 ENCOUNTER — MEDICAL CORRESPONDENCE (OUTPATIENT)
Dept: TRANSPLANT | Facility: CLINIC | Age: 62
End: 2017-09-26

## 2017-09-28 ENCOUNTER — TELEPHONE (OUTPATIENT)
Dept: TRANSPLANT | Facility: CLINIC | Age: 62
End: 2017-09-28

## 2017-09-28 NOTE — LETTER
September 28, 2017    Bro Barnes  [address] [facility] COVE  WINNEPEG MB R2J 4A4      Dear Mr. Barnes,    The purpose of this letter is to let you know the [facility] Multi-Disciplinary Selection Team has removed you from the kidney transplant list because we were informed that your received a kidney transplant in [place] on 9/27/2017.   Important things you should know:    If you would like to discuss the decision, or if your medical status changes, you may call [phone] and ask to speak to your transplant coordinator.    We recommend that you continue to follow up with your primary care and referring physicians in order to manage your health concerns.  Enclosed is a letter from [employer] which describes the services offered to patients by [facility] and the [facility].  Thank you for allowing us to participate in your care.  We wish you well now and in the future.   Sincerely,    Kidney Transplant Team  Enclosure:  [employer] Letter     CC:   Dr. Micky Montesinos           [facility]

## 2017-10-04 ENCOUNTER — TEAM CONFERENCE (OUTPATIENT)
Dept: TRANSPLANT | Facility: CLINIC | Age: 62
End: 2017-10-04

## 2017-10-04 NOTE — TELEPHONE ENCOUNTER
Team Conference:      Attendees:Dr. King, Dr. Suarez., Dr. Landin, Dr. Doran, Coordinator, , Dietician, Pharmacy    Discussion and Outcome: Patient status changed to de-listed. Patient transplanted elsewhere.

## 2019-06-11 NOTE — PROGRESS NOTES
K transplantation Education:  Bro and CAROLYN Romerolaverneid attended class.    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  Both were attentive listeners. 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   I briefly reviewed our IKTP committee may review his cPRA after one year on the list.  He has [facility] INS and will be followed by the [facility] Transplant program after a kidney transplant.    I informed him that the live donor team will care for all donors. Recipient coordinator unable to give any information about donors.   Both verbal and written instruction on how to use [facility] was given.  He/they watched the Video contents as demonstrated in the class; Pre Transplant section with Transplant I video; In future we recommend he register for the [facility] to especially listen to the other content of  Pre Transplant; transplant medications; and Post transplant complications, and donor video.      In addition to the standard video content the following were reviewed in the slide content:  living donation, paired exchange, KDPI, Nephrectomy and Kidney transplant surgery incisions with typical length of stay and the need to stay local post-transplant post discharge.  ( [facility] transplant program is an exception, Dr. Barnes will be followed post transplant by their [place] program.    pt actively vomiting coffee ground emesis w/ no aspiration of gastric content 
noted. Dr. Greene notified.

## 2020-03-10 ENCOUNTER — HEALTH MAINTENANCE LETTER (OUTPATIENT)
Age: 65
End: 2020-03-10

## 2020-12-27 ENCOUNTER — HEALTH MAINTENANCE LETTER (OUTPATIENT)
Age: 65
End: 2020-12-27

## 2021-04-24 ENCOUNTER — HEALTH MAINTENANCE LETTER (OUTPATIENT)
Age: 66
End: 2021-04-24

## 2021-10-09 ENCOUNTER — HEALTH MAINTENANCE LETTER (OUTPATIENT)
Age: 66
End: 2021-10-09

## 2022-05-21 ENCOUNTER — HEALTH MAINTENANCE LETTER (OUTPATIENT)
Age: 67
End: 2022-05-21

## 2022-09-17 ENCOUNTER — HEALTH MAINTENANCE LETTER (OUTPATIENT)
Age: 67
End: 2022-09-17

## 2023-06-03 ENCOUNTER — HEALTH MAINTENANCE LETTER (OUTPATIENT)
Age: 68
End: 2023-06-03